# Patient Record
Sex: MALE | Race: WHITE | NOT HISPANIC OR LATINO | Employment: OTHER | URBAN - METROPOLITAN AREA
[De-identification: names, ages, dates, MRNs, and addresses within clinical notes are randomized per-mention and may not be internally consistent; named-entity substitution may affect disease eponyms.]

---

## 2019-01-24 ENCOUNTER — TELEPHONE (OUTPATIENT)
Dept: NEUROLOGY | Facility: CLINIC | Age: 64
End: 2019-01-24

## 2019-01-24 NOTE — TELEPHONE ENCOUNTER
----- Message from Mary Navarro sent at 1/24/2019 12:37 PM CST -----  Contact: wife  Type: Needs Medical Advice    Who Called:  Eleanor-wife  Best Call Back Number: [phone]  Additional Information: calling to get a new patient appt it would not let me book one. Would like a call back. thanks

## 2019-01-24 NOTE — TELEPHONE ENCOUNTER
No referral received. Pt wife advised referral needed prior to new pt appt scheduling pt wife to call PCP to obtain referral . She will contact office to schedule when completed.

## 2019-01-28 ENCOUNTER — TELEPHONE (OUTPATIENT)
Dept: NEUROLOGY | Facility: CLINIC | Age: 64
End: 2019-01-28

## 2019-01-28 NOTE — TELEPHONE ENCOUNTER
Spoke with pt wife and booked consult appt with Dr. Pires for hereditary and idiopathic neuropathy per Dr. Carlos Nazario; date/time/location confirmed; verbalized understanding.

## 2019-01-28 NOTE — TELEPHONE ENCOUNTER
----- Message from Elisha Hoang sent at 1/28/2019 11:20 AM CST -----  Contact: Eleanor Atwood (Spouse)  Type: Needs Medical Advice    Who Called:  Eleanor Coroneljil (Spouse)  Best Call Back Number:   Additional Information: Calling to speak with the Nurse. The referral is in the system but it is pending authorization. Please advise. Call to pod. No answer.

## 2019-01-30 ENCOUNTER — OFFICE VISIT (OUTPATIENT)
Dept: NEUROLOGY | Facility: CLINIC | Age: 64
End: 2019-01-30
Payer: COMMERCIAL

## 2019-01-30 ENCOUNTER — TELEPHONE (OUTPATIENT)
Dept: NEUROLOGY | Facility: CLINIC | Age: 64
End: 2019-01-30

## 2019-01-30 VITALS
SYSTOLIC BLOOD PRESSURE: 133 MMHG | BODY MASS INDEX: 27.32 KG/M2 | WEIGHT: 190.81 LBS | HEART RATE: 54 BPM | DIASTOLIC BLOOD PRESSURE: 81 MMHG | HEIGHT: 70 IN | RESPIRATION RATE: 20 BRPM

## 2019-01-30 DIAGNOSIS — M54.2 CERVICALGIA: ICD-10-CM

## 2019-01-30 DIAGNOSIS — R51.9 NONINTRACTABLE HEADACHE, UNSPECIFIED CHRONICITY PATTERN, UNSPECIFIED HEADACHE TYPE: ICD-10-CM

## 2019-01-30 DIAGNOSIS — R20.2 PARESTHESIA: Primary | ICD-10-CM

## 2019-01-30 PROCEDURE — 3008F BODY MASS INDEX DOCD: CPT | Mod: CPTII,S$GLB,, | Performed by: PSYCHIATRY & NEUROLOGY

## 2019-01-30 PROCEDURE — 99999 PR PBB SHADOW E&M-EST. PATIENT-LVL IV: ICD-10-PCS | Mod: PBBFAC,,, | Performed by: PSYCHIATRY & NEUROLOGY

## 2019-01-30 PROCEDURE — 3008F PR BODY MASS INDEX (BMI) DOCUMENTED: ICD-10-PCS | Mod: CPTII,S$GLB,, | Performed by: PSYCHIATRY & NEUROLOGY

## 2019-01-30 PROCEDURE — 99999 PR PBB SHADOW E&M-EST. PATIENT-LVL IV: CPT | Mod: PBBFAC,,, | Performed by: PSYCHIATRY & NEUROLOGY

## 2019-01-30 PROCEDURE — 99205 OFFICE O/P NEW HI 60 MIN: CPT | Mod: S$GLB,,, | Performed by: PSYCHIATRY & NEUROLOGY

## 2019-01-30 PROCEDURE — 99205 PR OFFICE/OUTPT VISIT, NEW, LEVL V, 60-74 MIN: ICD-10-PCS | Mod: S$GLB,,, | Performed by: PSYCHIATRY & NEUROLOGY

## 2019-01-30 RX ORDER — GABAPENTIN 300 MG/1
300 CAPSULE ORAL NIGHTLY
COMMUNITY
End: 2019-01-30

## 2019-01-30 RX ORDER — GABAPENTIN 300 MG/1
300 CAPSULE ORAL 3 TIMES DAILY
Qty: 90 CAPSULE | Refills: 11 | Status: SHIPPED | OUTPATIENT
Start: 2019-01-30 | End: 2020-10-05

## 2019-01-30 NOTE — LETTER
January 30, 2019      Carlos Nazario MD  [address]  [address]  [facility] [place] [address]           [facility]  [address] [facility] [address] [place] [address]  Phone: [phone]  Fax: [phone]          Patient: Javid Atwood   MR Number: [id number]   YOB: 1955   Date of Visit: 1/30/2019       Dear Dr. Carlos Nazario:    Thank you for referring Javid Atwood to me for evaluation. Attached you will find relevant portions of my assessment and plan of care.    If you have questions, please do not hesitate to call me. I look forward to following Javid Atwood along with you.    Sincerely,    Leonardo Pires Jr., MD    Enclosure  CC:  No Recipients    If you would like to receive this communication electronically, please contact [email or web address] or [phone] to request more information on [facility] access.    For providers and/or their staff who would like to refer a patient to [facility], please contact us through our one-stop-shop provider referral line, [facility], at [phone].    If you feel you have received this communication in error or would no longer like to receive these types of communications, please e-mail [email or web address]

## 2019-01-30 NOTE — PROGRESS NOTES
"Date of service:  1/30/2019    Chief complaint:  Paresthesias    History of present illness:  The patient is a 63 y.o. male referred for evaluation of paresthesia. This began about 2 months ago. The sensation is located in the feet bilaterally.  The feeling is characterized as "numb"/"tingly" and is moderate in intensity.  There are no exacerbating or relieving factors.  He has tried Neurontin 300 mg QHS with no benefit.  He also noticed no associated symptoms.  The sensation is present continuously.      The patient also reports that he started having headaches 6 months ago.  He indicates they are frontal.  They are throbbing in character and moderate in intensity.  He gets relief from Advil.  He notes no exacerbating factors.  He denies associated symptoms.  Each headache lasts for a few hours.  He reports about 1 headache every 1-2 weeks.    The patient also reports a history of neck pain.  This has been present for a number of years and has not changed recently.      Past Medical History:   Diagnosis Date    Numbness and tingling of both feet    Osteoarthritis      Past Surgical History:   Procedure Laterality Date    TOTAL KNEE  PROSTHESIS REMOVAL W/ SPACER INSERTION Right     TUMOR REMOVAL Left     left thigh   (patient not sure of the type of tumor)      Family History   Problem Relation Age of Onset    Bone cancer Mother     Lymphoma Mother     COPD Father     Prostate cancer Father        Social History     Socioeconomic History    Marital status:      Spouse name: None    Number of children: None    Years of education: None    Highest education level: None   Social Needs    Financial resource strain: None    Food insecurity - worry: None    Food insecurity - inability: None    Transportation needs - medical: None    Transportation needs - non-medical: None   Occupational History    None   Tobacco Use    Smoking status: Current Every Day Smoker     Packs/day: 1.00     Years: 40.00 " "    Pack years: 40.00    Smokeless tobacco: Never Used   Substance and Sexual Activity    Alcohol use: No     Frequency: Never    Drug use: No    Sexual activity: Yes     Partners: Female   Other Topics Concern    None   Social History Narrative    None   + alcohol use currently, formerly heavier but unable to specify      Review of patient's allergies indicates:  No Known Allergies    Review of Systems  General/Constitutional:  No unintentional weight loss, No change in appetite  Eyes/Vision:  No change in vision, No double vision  ENT:  No frequent nose bleeds, No ringing in the ears  Respiratory:  No cough, No wheezing  Cardiovascular:  No chest pain, No palpitations  Gastrointestinal:  No jaundice, No nausea/vomiting  Genitourinary:  No incontinence, No burning with urination  Hematologic/Lymphatic:  No easy bruising/bleeding, No night sweats  Neurological:  + numbness, No weakness  Endocrine:  No fatigue, No heat/cold intolerance  Allergy/Immunologic:  No fevers, No chills  Musculoskeletal:  No muscle pain, No joint pain   Psychiatric:  No thoughts of harming self/others, No depression  Integumentary:  No rashes, No sores that do not heal    Physical exam:  /81   Pulse (!) 54   Resp 20   Ht 5' 10" (1.778 m)   Wt 86.5 kg (190 lb 12.8 oz)   BMI 27.38 kg/m²   General: Well developed, well nourished.  No acute distress.  ENT: Mucus membranes moist.  Atraumatic external nose and ears.  Lymphatic: No apparent lymphadenopathy.  Cardiovascular: Regular rate and rhythm.  Pulmonary: No increased work of breathing.  Abdomen/GI: No guarding.  Musculoskeletal: No obvious joint deformities, moves all extremities well.    Neurological exam:  Mental status: Awake and alert.  Oriented x4.  Speech fluent and appropriate.  Recent and remote memory appear to be intact.  Fund of knowledge normal.  Cranial nerves: Pupils equal round and reactive to light, extraocular movements intact, facial strength and sensation " intact bilaterally, palate and tongue midline, hearing grossly intact bilaterally.  Motor: 5 out of 5 strength throughout the upper and lower extremities bilaterally. Normal bulk and tone.  Sensation: Intact to light touch and temperature bilaterally.  DTR: 2+ at the knees and biceps bilaterally.  Coordination: Finger-nose-finger testing intact bilaterally.  Gait: Normal gait. Good tandem.    Data base:  Notes of the referring physician were not available for review at the time of consultation.  We will request them.    Labs (1/19):  CMP: includes cr=1.1  CBC: unremarkable  Vitamin B12: 446  Vitamin B1: 153  TSH: 1.56    Assessment and plan:  The patient is a 63 y.o. male referred for evaluation of paresthesia. I suspect that this issue is related to a peripheral neuropathy.  Certainly, his history of alcohol consumption could be playing a contributory role.  There are no other obvious risk factors reported by the patient; however, we will obtain serologies to screen for other potential contributory factors.  We will also check an EMG to characterize his neuropathy further.  We will give the patient Neurontin 300 mg TID for symptomatic relief. Medication side effects were discussed with the patient.  I have instructed him to discontinue all alcohol use.    The etiology of the patient's headaches is unclear.  We will check an MRI of the brain to rule out structural causes of headache, particularly given his history of smoking.  He may use prn OTC analgesics given his current headache frequency.  If this worsens, though, we will consider referral to headache clinic.    He also reports a longstanding history of neck pain and desires further evaluation/treatment.  We will check plain films of the C-spine.  We will refer him to spine clinic.    Finally, I have counseled the patient to stop smoking.    We will plan on seeing the patient back in a few weeks.

## 2020-10-05 ENCOUNTER — OFFICE VISIT (OUTPATIENT)
Dept: FAMILY MEDICINE | Facility: CLINIC | Age: 65
End: 2020-10-05
Payer: COMMERCIAL

## 2020-10-05 VITALS
DIASTOLIC BLOOD PRESSURE: 80 MMHG | HEART RATE: 64 BPM | BODY MASS INDEX: 25.48 KG/M2 | SYSTOLIC BLOOD PRESSURE: 126 MMHG | HEIGHT: 70 IN | TEMPERATURE: 98 F | WEIGHT: 178 LBS

## 2020-10-05 DIAGNOSIS — L72.3 SEBACEOUS CYST: Primary | ICD-10-CM

## 2020-10-05 DIAGNOSIS — L40.9 PSORIASIS: ICD-10-CM

## 2020-10-05 DIAGNOSIS — G60.3 IDIOPATHIC PROGRESSIVE NEUROPATHY: ICD-10-CM

## 2020-10-05 PROCEDURE — 3008F BODY MASS INDEX DOCD: CPT | Mod: S$GLB,,, | Performed by: FAMILY MEDICINE

## 2020-10-05 PROCEDURE — 99213 PR OFFICE/OUTPT VISIT, EST, LEVL III, 20-29 MIN: ICD-10-PCS | Mod: S$GLB,,, | Performed by: FAMILY MEDICINE

## 2020-10-05 PROCEDURE — 99213 OFFICE O/P EST LOW 20 MIN: CPT | Mod: S$GLB,,, | Performed by: FAMILY MEDICINE

## 2020-10-05 PROCEDURE — 3008F PR BODY MASS INDEX (BMI) DOCUMENTED: ICD-10-PCS | Mod: S$GLB,,, | Performed by: FAMILY MEDICINE

## 2020-10-05 RX ORDER — CEPHALEXIN 500 MG/1
500 CAPSULE ORAL 3 TIMES DAILY
Qty: 30 CAPSULE | Refills: 0 | OUTPATIENT
Start: 2020-10-05 | End: 2021-11-30

## 2020-10-05 RX ORDER — CLOBETASOL PROPIONATE 0.5 MG/G
OINTMENT TOPICAL 2 TIMES DAILY
Qty: 45 G | Refills: 3 | Status: SHIPPED | OUTPATIENT
Start: 2020-10-05 | End: 2022-11-16 | Stop reason: CLARIF

## 2020-10-05 RX ORDER — PREGABALIN 75 MG/1
75 CAPSULE ORAL 2 TIMES DAILY
Qty: 60 CAPSULE | Refills: 3 | Status: SHIPPED | OUTPATIENT
Start: 2020-10-05 | End: 2023-04-20 | Stop reason: ALTCHOICE

## 2020-10-05 NOTE — LETTER
[address]  [place] [address]  Phone: [phone]   [phone]                        MD Ambreen Willis MD Chequita Williams, MD Matthew Bassett, PA-C Allison Hoffritz, CONNIE Bautista NP      Date: 10/05/2020        Patient: Javid Atwood  YOB: 1955      Please fax a copy of pt's recent colonoscopy.        Sincerely,     Ivy Zuluaga MA

## 2020-10-06 NOTE — PROGRESS NOTES
SUBJECTIVE:    Patient ID: Javid Atwood is a 65 y.o. male.    Chief Complaint: Mass (inner thigh ac // Colonoscopy - Dr. Gonzalez // Flu - already had -ac )    55-year-old male comes in with a swelling in the and groin 3-4 days.  He does not have a history of boils.  He has no streaking or cellulitis of the legs no fever or chills.    He does complain constant numbness to the bottom of his feet and had no relief with gabapentin in the past    He complains of some dry scaly rash on the pretibial area of his left shin.    He is a smoker however does not drink any alcohol.      No visits with results within 6 Month(s) from this visit.   Latest known visit with results is:   No results found for any previous visit.       Past Medical History:   Diagnosis Date    Numbness and tingling of both feet      Social History     Socioeconomic History    Marital status:      Spouse name: Not on file    Number of children: Not on file    Years of education: Not on file    Highest education level: Not on file   Occupational History    Not on file   Social Needs    Financial resource strain: Not on file    Food insecurity     Worry: Not on file     Inability: Not on file    Transportation needs     Medical: Not on file     Non-medical: Not on file   Tobacco Use    Smoking status: Current Every Day Smoker     Packs/day: 1.00     Years: 40.00     Pack years: 40.00    Smokeless tobacco: Never Used   Substance and Sexual Activity    Alcohol use: No     Frequency: Never    Drug use: No    Sexual activity: Yes     Partners: Female   Lifestyle    Physical activity     Days per week: Not on file     Minutes per session: Not on file    Stress: Not on file   Relationships    Social connections     Talks on phone: Not on file     Gets together: Not on file     Attends Bahai service: Not on file     Active member of club or organization: Not on file     Attends meetings of clubs or organizations: Not on  "file     Relationship status: Not on file   Other Topics Concern    Not on file   Social History Narrative    Not on file     Past Surgical History:   Procedure Laterality Date    TOTAL KNEE  PROSTHESIS REMOVAL W/ SPACER INSERTION Right     TUMOR REMOVAL Left     left thigh     Family History   Problem Relation Age of Onset    Bone cancer Mother     Lymphoma Mother     COPD Father     Prostate cancer Father        Review of patient's allergies indicates:  No Known Allergies    Current Outpatient Medications:     cephALEXin (KEFLEX) 500 MG capsule, Take 1 capsule (500 mg total) by mouth 3 (three) times daily., Disp: 30 capsule, Rfl: 0    clobetasol 0.05% (TEMOVATE) 0.05 % Oint, Apply topically 2 (two) times daily., Disp: 45 g, Rfl: 3    pregabalin (LYRICA) 75 MG capsule, Take 1 capsule (75 mg total) by mouth 2 (two) times daily., Disp: 60 capsule, Rfl: 3    Review of Systems   Constitutional: Negative for chills and fever.   Genitourinary: Negative for difficulty urinating and dysuria.   Skin: Positive for rash.          Objective:      Vitals:    10/05/20 1124   BP: 126/80   Pulse: 64   Temp: 98.3 °F (36.8 °C)   Weight: 80.7 kg (178 lb)   Height: 5' 10" (1.778 m)     Physical Exam  Vitals signs and nursing note reviewed.   Constitutional:       Appearance: He is well-developed.   HENT:      Head: Normocephalic and atraumatic.      Right Ear: External ear normal.      Left Ear: External ear normal.      Nose: Nose normal.   Eyes:      Pupils: Pupils are equal, round, and reactive to light.   Neck:      Musculoskeletal: Normal range of motion and neck supple.      Thyroid: No thyromegaly.      Vascular: No carotid bruit.   Cardiovascular:      Rate and Rhythm: Normal rate and regular rhythm.      Heart sounds: Normal heart sounds. No murmur.   Pulmonary:      Effort: Pulmonary effort is normal.      Breath sounds: Normal breath sounds. No wheezing or rales.   Abdominal:      General: Bowel sounds are " normal. There is no distension.      Palpations: Abdomen is soft.      Tenderness: There is no abdominal tenderness.   Genitourinary:     Comments: 2 x 2 cm sebaceous cyst in the left perineum, firm not fluctuant.  Musculoskeletal: Normal range of motion.         General: No tenderness or deformity.      Lumbar back: Normal. He exhibits no pain and no spasm.      Comments: Bends 90 degrees at  waist   Lymphadenopathy:      Cervical: No cervical adenopathy.   Skin:     General: Skin is warm and dry.      Findings: No rash.      Comments: Left shin has dry silvery scaly crusty lesions compatible with psoriasis.   Neurological:      Mental Status: He is alert and oriented to person, place, and time.      Cranial Nerves: No cranial nerve deficit.      Coordination: Coordination normal.   Psychiatric:         Behavior: Behavior normal.         Thought Content: Thought content normal.         Judgment: Judgment normal.           Assessment:       1. Sebaceous cyst    2. Idiopathic progressive neuropathy    3. Psoriasis         Plan:       Sebaceous cyst  -     pregabalin (LYRICA) 75 MG capsule; Take 1 capsule (75 mg total) by mouth 2 (two) times daily.  Dispense: 60 capsule; Refill: 3    Idiopathic progressive neuropathy  -     cephALEXin (KEFLEX) 500 MG capsule; Take 1 capsule (500 mg total) by mouth 3 (three) times daily.  Dispense: 30 capsule; Refill: 0    Psoriasis  -     clobetasol 0.05% (TEMOVATE) 0.05 % Oint; Apply topically 2 (two) times daily.  Dispense: 45 g; Refill: 3      Follow up in about 6 months (around 4/5/2021), or Neuropathy.        10/5/2020 Carlos Nazario

## 2021-04-29 ENCOUNTER — PATIENT MESSAGE (OUTPATIENT)
Dept: RESEARCH | Facility: HOSPITAL | Age: 66
End: 2021-04-29

## 2021-11-12 ENCOUNTER — HOSPITAL ENCOUNTER (EMERGENCY)
Facility: HOSPITAL | Age: 66
Discharge: HOME | End: 2021-11-12
Attending: EMERGENCY MEDICINE
Payer: COMMERCIAL

## 2021-11-12 VITALS
HEART RATE: 56 BPM | TEMPERATURE: 98 F | OXYGEN SATURATION: 97 % | WEIGHT: 180 LBS | HEIGHT: 69 IN | SYSTOLIC BLOOD PRESSURE: 124 MMHG | RESPIRATION RATE: 20 BRPM | DIASTOLIC BLOOD PRESSURE: 78 MMHG | BODY MASS INDEX: 26.66 KG/M2

## 2021-11-12 DIAGNOSIS — R07.9 CHEST PAIN: ICD-10-CM

## 2021-11-12 DIAGNOSIS — Z53.29 LEFT AGAINST MEDICAL ADVICE: ICD-10-CM

## 2021-11-12 DIAGNOSIS — J32.9 SINUSITIS, UNSPECIFIED CHRONICITY, UNSPECIFIED LOCATION: Primary | ICD-10-CM

## 2021-11-12 LAB
ALBUMIN SERPL BCP-MCNC: 4 G/DL (ref 3.5–5.2)
ALP SERPL-CCNC: 57 U/L (ref 55–135)
ALT SERPL W/O P-5'-P-CCNC: 18 U/L (ref 10–44)
ANION GAP SERPL CALC-SCNC: 10 MMOL/L (ref 8–16)
AST SERPL-CCNC: 19 U/L (ref 10–40)
BASOPHILS # BLD AUTO: 0.07 K/UL (ref 0–0.2)
BASOPHILS NFR BLD: 0.7 % (ref 0–1.9)
BILIRUB SERPL-MCNC: 1.3 MG/DL (ref 0.1–1)
BNP SERPL-MCNC: 34 PG/ML (ref 0–99)
BUN SERPL-MCNC: 20 MG/DL (ref 8–23)
CALCIUM SERPL-MCNC: 9 MG/DL (ref 8.7–10.5)
CHLORIDE SERPL-SCNC: 107 MMOL/L (ref 95–110)
CK SERPL-CCNC: 103 U/L (ref 20–200)
CO2 SERPL-SCNC: 23 MMOL/L (ref 23–29)
CREAT SERPL-MCNC: 0.9 MG/DL (ref 0.5–1.4)
D DIMER PPP IA.FEU-MCNC: 0.3 UG/ML FEU
DIFFERENTIAL METHOD: ABNORMAL
EOSINOPHIL # BLD AUTO: 0.1 K/UL (ref 0–0.5)
EOSINOPHIL NFR BLD: 1.1 % (ref 0–8)
ERYTHROCYTE [DISTWIDTH] IN BLOOD BY AUTOMATED COUNT: 13.5 % (ref 11.5–14.5)
EST. GFR  (AFRICAN AMERICAN): >60 ML/MIN/1.73 M^2
EST. GFR  (NON AFRICAN AMERICAN): >60 ML/MIN/1.73 M^2
GLUCOSE SERPL-MCNC: 102 MG/DL (ref 70–110)
HCT VFR BLD AUTO: 47 % (ref 40–54)
HGB BLD-MCNC: 15.8 G/DL (ref 14–18)
IMM GRANULOCYTES # BLD AUTO: 0.04 K/UL (ref 0–0.04)
IMM GRANULOCYTES NFR BLD AUTO: 0.4 % (ref 0–0.5)
INR PPP: 1.1
LIPASE SERPL-CCNC: 20 U/L (ref 4–60)
LYMPHOCYTES # BLD AUTO: 1.4 K/UL (ref 1–4.8)
LYMPHOCYTES NFR BLD: 14.1 % (ref 18–48)
MAGNESIUM SERPL-MCNC: 2.2 MG/DL (ref 1.6–2.6)
MCH RBC QN AUTO: 29.8 PG (ref 27–31)
MCHC RBC AUTO-ENTMCNC: 33.6 G/DL (ref 32–36)
MCV RBC AUTO: 89 FL (ref 82–98)
MONOCYTES # BLD AUTO: 0.6 K/UL (ref 0.3–1)
MONOCYTES NFR BLD: 5.8 % (ref 4–15)
NEUTROPHILS # BLD AUTO: 7.9 K/UL (ref 1.8–7.7)
NEUTROPHILS NFR BLD: 77.9 % (ref 38–73)
NRBC BLD-RTO: 0 /100 WBC
PLATELET # BLD AUTO: 221 K/UL (ref 150–450)
PMV BLD AUTO: 9.9 FL (ref 9.2–12.9)
POTASSIUM SERPL-SCNC: 3.8 MMOL/L (ref 3.5–5.1)
PROT SERPL-MCNC: 7.1 G/DL (ref 6–8.4)
PROTHROMBIN TIME: 13 SEC (ref 11.4–13.7)
RBC # BLD AUTO: 5.31 M/UL (ref 4.6–6.2)
SARS-COV-2 RDRP RESP QL NAA+PROBE: NEGATIVE
SODIUM SERPL-SCNC: 140 MMOL/L (ref 136–145)
TROPONIN I SERPL DL<=0.01 NG/ML-MCNC: <0.03 NG/ML
WBC # BLD AUTO: 10.15 K/UL (ref 3.9–12.7)

## 2021-11-12 PROCEDURE — 85610 PROTHROMBIN TIME: CPT | Performed by: EMERGENCY MEDICINE

## 2021-11-12 PROCEDURE — 84484 ASSAY OF TROPONIN QUANT: CPT | Performed by: EMERGENCY MEDICINE

## 2021-11-12 PROCEDURE — 83880 ASSAY OF NATRIURETIC PEPTIDE: CPT | Performed by: EMERGENCY MEDICINE

## 2021-11-12 PROCEDURE — 83690 ASSAY OF LIPASE: CPT | Performed by: EMERGENCY MEDICINE

## 2021-11-12 PROCEDURE — 93010 ELECTROCARDIOGRAM REPORT: CPT | Mod: ,,, | Performed by: GENERAL PRACTICE

## 2021-11-12 PROCEDURE — 93010 EKG 12-LEAD: ICD-10-PCS | Mod: ,,, | Performed by: GENERAL PRACTICE

## 2021-11-12 PROCEDURE — 93005 ELECTROCARDIOGRAM TRACING: CPT | Performed by: GENERAL PRACTICE

## 2021-11-12 PROCEDURE — 99284 EMERGENCY DEPT VISIT MOD MDM: CPT | Mod: 25

## 2021-11-12 PROCEDURE — 36415 COLL VENOUS BLD VENIPUNCTURE: CPT | Performed by: EMERGENCY MEDICINE

## 2021-11-12 PROCEDURE — 80053 COMPREHEN METABOLIC PANEL: CPT | Performed by: EMERGENCY MEDICINE

## 2021-11-12 PROCEDURE — 85025 COMPLETE CBC W/AUTO DIFF WBC: CPT | Performed by: EMERGENCY MEDICINE

## 2021-11-12 PROCEDURE — 82550 ASSAY OF CK (CPK): CPT | Performed by: EMERGENCY MEDICINE

## 2021-11-12 PROCEDURE — U0002 COVID-19 LAB TEST NON-CDC: HCPCS | Performed by: EMERGENCY MEDICINE

## 2021-11-12 PROCEDURE — 85379 FIBRIN DEGRADATION QUANT: CPT | Performed by: EMERGENCY MEDICINE

## 2021-11-12 PROCEDURE — 83735 ASSAY OF MAGNESIUM: CPT | Performed by: EMERGENCY MEDICINE

## 2021-11-12 RX ORDER — ONDANSETRON 4 MG/1
4 TABLET, ORALLY DISINTEGRATING ORAL EVERY 6 HOURS PRN
Qty: 9 TABLET | Refills: 0 | Status: SHIPPED | OUTPATIENT
Start: 2021-11-12 | End: 2022-11-16 | Stop reason: CLARIF

## 2021-11-30 ENCOUNTER — HOSPITAL ENCOUNTER (EMERGENCY)
Facility: HOSPITAL | Age: 66
Discharge: HOME | End: 2021-11-30
Attending: EMERGENCY MEDICINE
Payer: OTHER MISCELLANEOUS

## 2021-11-30 VITALS
DIASTOLIC BLOOD PRESSURE: 79 MMHG | HEART RATE: 58 BPM | OXYGEN SATURATION: 99 % | RESPIRATION RATE: 18 BRPM | TEMPERATURE: 98 F | BODY MASS INDEX: 27.4 KG/M2 | WEIGHT: 185 LBS | HEIGHT: 69 IN | SYSTOLIC BLOOD PRESSURE: 132 MMHG

## 2021-11-30 DIAGNOSIS — S61.411A LACERATION OF RIGHT HAND WITHOUT FOREIGN BODY, INITIAL ENCOUNTER: Primary | ICD-10-CM

## 2021-11-30 PROCEDURE — 25000003 PHARM REV CODE 250: Performed by: PHYSICIAN ASSISTANT

## 2021-11-30 PROCEDURE — 12002 RPR S/N/AX/GEN/TRNK2.6-7.5CM: CPT | Mod: RT

## 2021-11-30 PROCEDURE — 99283 EMERGENCY DEPT VISIT LOW MDM: CPT | Mod: 25

## 2021-11-30 RX ORDER — LIDOCAINE HYDROCHLORIDE 10 MG/ML
10 INJECTION INFILTRATION; PERINEURAL
Status: COMPLETED | OUTPATIENT
Start: 2021-11-30 | End: 2021-11-30

## 2021-11-30 RX ORDER — CEPHALEXIN 500 MG/1
500 CAPSULE ORAL 4 TIMES DAILY
Qty: 20 CAPSULE | Refills: 0 | Status: SHIPPED | OUTPATIENT
Start: 2021-11-30 | End: 2021-12-05

## 2021-11-30 RX ADMIN — LIDOCAINE HYDROCHLORIDE 10 ML: 10 INJECTION, SOLUTION INFILTRATION; PERINEURAL at 11:11

## 2021-12-08 ENCOUNTER — TELEPHONE (OUTPATIENT)
Dept: FAMILY MEDICINE | Facility: CLINIC | Age: 66
End: 2021-12-08
Payer: OTHER GOVERNMENT

## 2021-12-09 ENCOUNTER — HOSPITAL ENCOUNTER (EMERGENCY)
Facility: HOSPITAL | Age: 66
Discharge: HOME | End: 2021-12-09
Attending: EMERGENCY MEDICINE
Payer: OTHER MISCELLANEOUS

## 2021-12-09 VITALS
TEMPERATURE: 98 F | SYSTOLIC BLOOD PRESSURE: 118 MMHG | BODY MASS INDEX: 27.43 KG/M2 | WEIGHT: 185.19 LBS | HEIGHT: 69 IN | OXYGEN SATURATION: 98 % | RESPIRATION RATE: 18 BRPM | DIASTOLIC BLOOD PRESSURE: 74 MMHG | HEART RATE: 57 BPM

## 2021-12-09 DIAGNOSIS — Z48.02 VISIT FOR SUTURE REMOVAL: Primary | ICD-10-CM

## 2021-12-09 DIAGNOSIS — Z51.89 VISIT FOR WOUND CHECK: ICD-10-CM

## 2021-12-09 PROCEDURE — 99282 EMERGENCY DEPT VISIT SF MDM: CPT

## 2021-12-14 ENCOUNTER — PES CALL (OUTPATIENT)
Dept: ADMINISTRATIVE | Facility: CLINIC | Age: 66
End: 2021-12-14
Payer: COMMERCIAL

## 2022-11-16 ENCOUNTER — HOSPITAL ENCOUNTER (EMERGENCY)
Facility: HOSPITAL | Age: 67
Discharge: HOME | End: 2022-11-16
Attending: EMERGENCY MEDICINE
Payer: OTHER GOVERNMENT

## 2022-11-16 VITALS
BODY MASS INDEX: 28.14 KG/M2 | HEIGHT: 69 IN | HEART RATE: 82 BPM | TEMPERATURE: 99 F | WEIGHT: 190 LBS | OXYGEN SATURATION: 96 % | SYSTOLIC BLOOD PRESSURE: 104 MMHG | RESPIRATION RATE: 18 BRPM | DIASTOLIC BLOOD PRESSURE: 64 MMHG

## 2022-11-16 DIAGNOSIS — U07.1 COVID-19 VIRUS DETECTED: ICD-10-CM

## 2022-11-16 DIAGNOSIS — U07.1 COVID-19: Primary | ICD-10-CM

## 2022-11-16 LAB
HCV AB SERPL QL IA: NORMAL
INFLUENZA A, MOLECULAR: NEGATIVE
INFLUENZA B, MOLECULAR: NEGATIVE
SARS-COV-2 RDRP RESP QL NAA+PROBE: POSITIVE
SPECIMEN SOURCE: NORMAL

## 2022-11-16 PROCEDURE — 36415 COLL VENOUS BLD VENIPUNCTURE: CPT | Performed by: EMERGENCY MEDICINE

## 2022-11-16 PROCEDURE — 87502 INFLUENZA DNA AMP PROBE: CPT | Performed by: EMERGENCY MEDICINE

## 2022-11-16 PROCEDURE — 87389 HIV-1 AG W/HIV-1&-2 AB AG IA: CPT | Performed by: EMERGENCY MEDICINE

## 2022-11-16 PROCEDURE — 99283 EMERGENCY DEPT VISIT LOW MDM: CPT | Mod: 25

## 2022-11-16 PROCEDURE — 86803 HEPATITIS C AB TEST: CPT | Performed by: EMERGENCY MEDICINE

## 2022-11-16 PROCEDURE — U0002 COVID-19 LAB TEST NON-CDC: HCPCS | Performed by: EMERGENCY MEDICINE

## 2022-11-16 NOTE — ED NOTES
Assumed care    Patient presents to ED with complaints of body aches, body weakness and chills. States it started this morning. Denies chest pain. Denies shortness of breath. Denies any recent falls. Denies taking meds at home. Patient states he has been around significant other who is sick.

## 2022-11-16 NOTE — ED PROVIDER NOTES
Chief complaint:  Headache (And feels shaky, legs feel weak, ambulated to triage without help, gait is steady)      HPI:  Javid Atwood is a 67 y.o. male with hx tobacco use, PVD presenting with onset malaise, chills, subjective fever, and frontal headache similar to prior of gradual onset this morning relieved by 1 g of acetaminophen.  Notable sick contact in his wife with flu-like illness improving several days ago.  Measured fever here in triage.  No cough or congestion at present.  No travel history.  No dyspnea or chest pain.  He did have generalized weakness without new focal numbness or weakness.  He does have chronic history of unchanged neuropathy.  Denies difficulty speaking or swallowing.  Generalized weakness is improved following acetaminophen as well.    ROS: As per HPI and below:  No seizure, loss of consciousness, dyspnea, abdominal pain, vomiting, diarrhea, blood in the stools, oliguria, dysuria, rashes, swelling.  Positive for fever.    Review of patient's allergies indicates:  No Known Allergies    Discharge Medication List as of 11/16/2022  2:46 PM        START taking these medications    Details   nirmatrelvir-ritonavir 300 mg (150 mg x 2)-100 mg copackaged tablets (EUA) Take 3 tablets by mouth 2 (two) times daily for 5 days. Each dose contains 2 nirmatrelvir (pink tablets) and 1 ritonavir (white tablet). Take all 3 tablets together, Print           CONTINUE these medications which have NOT CHANGED    Details   pregabalin (LYRICA) 75 MG capsule Take 1 capsule (75 mg total) by mouth 2 (two) times daily., Starting Mon 10/5/2020, Until Mon 4/5/2021, Normal             PMH:  As per HPI and below:  Past Medical History:   Diagnosis Date    Numbness and tingling of both feet      Past Surgical History:   Procedure Laterality Date    TOTAL KNEE  PROSTHESIS REMOVAL W/ SPACER INSERTION Right     TUMOR REMOVAL Left     left thigh       Social History     Socioeconomic History    Marital status:     Tobacco Use    Smoking status: Every Day     Packs/day: 1.00     Years: 40.00     Pack years: 40.00     Types: Cigarettes    Smokeless tobacco: Never   Substance and Sexual Activity    Alcohol use: No    Drug use: No    Sexual activity: Yes     Partners: Female       Family History   Problem Relation Age of Onset    Bone cancer Mother     Lymphoma Mother     COPD Father     Prostate cancer Father        Physical Exam:    Vitals:    11/16/22 1455   BP: 104/64   Pulse:    Resp:    Temp: 99.2 °F (37.3 °C)     GENERAL:  No apparent distress.  Alert.    HEENT:  Moist mucous membranes.  Normocephalic and atraumatic.    NECK:  No swelling.  Midline trachea. Supple.    CARDIOVASCULAR:  Regular rate and rhythm.  2+ radial pulses.  No murmur.    PULMONARY:  Lungs clear to auscultation bilaterally.  No wheezes, rales, or rhonci.    ABDOMEN:  Non-tender and non-distended.    EXTREMITIES:  Warm and well perfused.  Brisk capillary refill.    NEUROLOGICAL:  Normal mental status.  Appropriate and conversant.  5/5 strength and sensation.  CN III through XII intact.  Normal gait.    SKIN:  No rashes or ecchymoses.    BACK:  Atraumatic.  No CVA tenderness to palpation.      Labs Reviewed   SARS-COV-2 RNA AMPLIFICATION, QUAL - Abnormal; Notable for the following components:       Result Value    SARS-CoV-2 RNA, Amplification, Qual Positive (*)     All other components within normal limits   INFLUENZA A & B BY MOLECULAR   HIV 1 / 2 ANTIBODY   HEPATITIS C ANTIBODY       Discharge Medication List as of 11/16/2022  2:46 PM        START taking these medications    Details   nirmatrelvir-ritonavir 300 mg (150 mg x 2)-100 mg copackaged tablets (EUA) Take 3 tablets by mouth 2 (two) times daily for 5 days. Each dose contains 2 nirmatrelvir (pink tablets) and 1 ritonavir (white tablet). Take all 3 tablets together, Print           CONTINUE these medications which have NOT CHANGED    Details   pregabalin (LYRICA) 75 MG capsule Take 1  capsule (75 mg total) by mouth 2 (two) times daily., Starting Mon 10/5/2020, Until Mon 4/5/2021, Normal             Orders Placed This Encounter   Procedures    Influenza A & B by Molecular    COVID-19 Rapid Screening       Imaging Results    None              MDM:    67 y.o. male with malaise, chills, fever, and frontal headache.  Patient has no nuchal rigidity and minimal headache at present after acetaminophen.  I have very low suspicion for meningitis or encephalitis. I have very low suspicion for alternative causes of headache such as intracranial hemorrhage, ischemic process, meningitis, idiopathic intracranial hypertension, or cavernous venous sinus thrombosis.  The patient has a non-focal neurological examination with history not consistent with emergent causes of headache warranting present therapeutic intervention.  I do not think further brain imaging or lumbar puncture are indicated at this time.  Fever and constitutional symptoms much more likely related to viral syndrome with close sick contact insignificant other present.  May continue acetaminophen as necessary with NSAIDs not ideal secondary to other medical issues.  Influenza and COVID point of care testing sent here.  Lungs are clear to auscultation I doubt bacterial pneumonia.  I do not think chest imaging or antibiotics are indicated at this point.  Testing here is positive for COVID-19 which likely explains patient's symptoms.  He is appropriate for outpatient therapy with paxlovid prescribed.  Follow-up with PCP.  Detailed return precautions reviewed.    Diagnoses:    1. COVID-19 infection       Jose Miguel Kuhn MD  11/16/22 7279

## 2022-11-17 LAB — HIV 1+2 AB+HIV1 P24 AG SERPL QL IA: NORMAL

## 2023-04-20 ENCOUNTER — TELEPHONE (OUTPATIENT)
Dept: FAMILY MEDICINE | Facility: CLINIC | Age: 68
End: 2023-04-20

## 2023-04-20 ENCOUNTER — HOSPITAL ENCOUNTER (EMERGENCY)
Facility: HOSPITAL | Age: 68
Discharge: HOME | End: 2023-04-20
Attending: EMERGENCY MEDICINE
Payer: OTHER GOVERNMENT

## 2023-04-20 VITALS
WEIGHT: 190 LBS | HEART RATE: 54 BPM | OXYGEN SATURATION: 100 % | BODY MASS INDEX: 28.06 KG/M2 | RESPIRATION RATE: 18 BRPM | TEMPERATURE: 98 F | DIASTOLIC BLOOD PRESSURE: 90 MMHG | SYSTOLIC BLOOD PRESSURE: 149 MMHG

## 2023-04-20 DIAGNOSIS — J32.9 SINUSITIS, UNSPECIFIED CHRONICITY, UNSPECIFIED LOCATION: ICD-10-CM

## 2023-04-20 DIAGNOSIS — R55 NEAR SYNCOPE: Primary | ICD-10-CM

## 2023-04-20 LAB
ALBUMIN SERPL BCP-MCNC: 3.5 G/DL (ref 3.5–5.2)
ALP SERPL-CCNC: 61 U/L (ref 55–135)
ALT SERPL W/O P-5'-P-CCNC: 18 U/L (ref 10–44)
ANION GAP SERPL CALC-SCNC: 7 MMOL/L (ref 8–16)
AST SERPL-CCNC: 16 U/L (ref 10–40)
BASOPHILS # BLD AUTO: 0.08 K/UL (ref 0–0.2)
BASOPHILS NFR BLD: 1.2 % (ref 0–1.9)
BILIRUB SERPL-MCNC: 0.4 MG/DL (ref 0.1–1)
BNP SERPL-MCNC: 23 PG/ML (ref 0–99)
BUN SERPL-MCNC: 20 MG/DL (ref 8–23)
CALCIUM SERPL-MCNC: 8.9 MG/DL (ref 8.7–10.5)
CHLORIDE SERPL-SCNC: 107 MMOL/L (ref 95–110)
CO2 SERPL-SCNC: 25 MMOL/L (ref 23–29)
CREAT SERPL-MCNC: 1 MG/DL (ref 0.5–1.4)
DIFFERENTIAL METHOD: NORMAL
EOSINOPHIL # BLD AUTO: 0.2 K/UL (ref 0–0.5)
EOSINOPHIL NFR BLD: 3.2 % (ref 0–8)
ERYTHROCYTE [DISTWIDTH] IN BLOOD BY AUTOMATED COUNT: 13.7 % (ref 11.5–14.5)
EST. GFR  (NO RACE VARIABLE): >60 ML/MIN/1.73 M^2
GLUCOSE SERPL-MCNC: 87 MG/DL (ref 70–110)
GLUCOSE SERPL-MCNC: 87 MG/DL (ref 70–110)
HCT VFR BLD AUTO: 44.5 % (ref 40–54)
HGB BLD-MCNC: 14.9 G/DL (ref 14–18)
IMM GRANULOCYTES # BLD AUTO: 0.02 K/UL (ref 0–0.04)
IMM GRANULOCYTES NFR BLD AUTO: 0.3 % (ref 0–0.5)
LYMPHOCYTES # BLD AUTO: 2.2 K/UL (ref 1–4.8)
LYMPHOCYTES NFR BLD: 31.7 % (ref 18–48)
MAGNESIUM SERPL-MCNC: 2 MG/DL (ref 1.6–2.6)
MCH RBC QN AUTO: 29.9 PG (ref 27–31)
MCHC RBC AUTO-ENTMCNC: 33.5 G/DL (ref 32–36)
MCV RBC AUTO: 89 FL (ref 82–98)
MONOCYTES # BLD AUTO: 0.7 K/UL (ref 0.3–1)
MONOCYTES NFR BLD: 9.6 % (ref 4–15)
NEUTROPHILS # BLD AUTO: 3.7 K/UL (ref 1.8–7.7)
NEUTROPHILS NFR BLD: 54 % (ref 38–73)
NRBC BLD-RTO: 0 /100 WBC
PLATELET # BLD AUTO: 195 K/UL (ref 150–450)
PMV BLD AUTO: 9.5 FL (ref 9.2–12.9)
POTASSIUM SERPL-SCNC: 4.3 MMOL/L (ref 3.5–5.1)
PROT SERPL-MCNC: 6.2 G/DL (ref 6–8.4)
RBC # BLD AUTO: 4.99 M/UL (ref 4.6–6.2)
SODIUM SERPL-SCNC: 139 MMOL/L (ref 136–145)
TROPONIN I SERPL HS-MCNC: 3.6 PG/ML (ref 0–14.9)
WBC # BLD AUTO: 6.84 K/UL (ref 3.9–12.7)

## 2023-04-20 PROCEDURE — 93010 EKG 12-LEAD: ICD-10-PCS | Mod: ,,, | Performed by: SPECIALIST

## 2023-04-20 PROCEDURE — 80053 COMPREHEN METABOLIC PANEL: CPT | Performed by: EMERGENCY MEDICINE

## 2023-04-20 PROCEDURE — 99285 EMERGENCY DEPT VISIT HI MDM: CPT | Mod: 25

## 2023-04-20 PROCEDURE — 93005 ELECTROCARDIOGRAM TRACING: CPT | Performed by: SPECIALIST

## 2023-04-20 PROCEDURE — 83735 ASSAY OF MAGNESIUM: CPT | Performed by: EMERGENCY MEDICINE

## 2023-04-20 PROCEDURE — 85025 COMPLETE CBC W/AUTO DIFF WBC: CPT | Performed by: EMERGENCY MEDICINE

## 2023-04-20 PROCEDURE — 82962 GLUCOSE BLOOD TEST: CPT

## 2023-04-20 PROCEDURE — 83880 ASSAY OF NATRIURETIC PEPTIDE: CPT | Performed by: EMERGENCY MEDICINE

## 2023-04-20 PROCEDURE — 93010 ELECTROCARDIOGRAM REPORT: CPT | Mod: ,,, | Performed by: SPECIALIST

## 2023-04-20 PROCEDURE — 84484 ASSAY OF TROPONIN QUANT: CPT | Performed by: EMERGENCY MEDICINE

## 2023-04-20 RX ORDER — ASPIRIN 81 MG/1
81 TABLET ORAL DAILY
COMMUNITY
Start: 2022-08-16

## 2023-04-20 RX ORDER — PRAVASTATIN SODIUM 20 MG/1
1 TABLET ORAL DAILY
COMMUNITY
Start: 2022-08-16

## 2023-04-20 NOTE — ED PROVIDER NOTES
"Encounter Date: 4/20/2023       History     Chief Complaint   Patient presents with    Headache     Generalized, for "a while" . Symptoms have resolved.      Patient presents complaining of episode of double vision that occurred for approximately 10-15 seconds and patient fell flank.  Patient states he was driving yesterday to fall again when he felt symptoms of double vision like he may stay.  He.  Heart and his wife started driving and his symptoms resolved.  He denies any one-sided numbness tingling weakness.  He denies any headache associated with it.  He denies any persistence of the blurry vision now.  He denies chest pain or shortness of breath.    Review of patient's allergies indicates:  No Known Allergies  Past Medical History:   Diagnosis Date    Numbness and tingling of both feet      Past Surgical History:   Procedure Laterality Date    TOTAL KNEE  PROSTHESIS REMOVAL W/ SPACER INSERTION Right     TUMOR REMOVAL Left     left thigh     Family History   Problem Relation Age of Onset    Bone cancer Mother     Lymphoma Mother     COPD Father     Prostate cancer Father      Social History     Tobacco Use    Smoking status: Every Day     Packs/day: 1.00     Years: 40.00     Pack years: 40.00     Types: Cigarettes    Smokeless tobacco: Never   Substance Use Topics    Alcohol use: No    Drug use: No     Review of Systems   All other systems reviewed and are negative.    Physical Exam     Initial Vitals [04/20/23 0943]   BP Pulse Resp Temp SpO2   104/84 (!) 56 20 98.3 °F (36.8 °C) 96 %      MAP       --         Physical Exam    Nursing note and vitals reviewed.  Constitutional: He appears well-developed and well-nourished. He is not diaphoretic. No distress.   HENT:   Head: Normocephalic and atraumatic.   Mouth/Throat: Oropharynx is clear and moist.   Eyes: EOM are normal.   Neck: Neck supple.   Normal range of motion.  Cardiovascular:  Normal rate, regular rhythm, normal heart sounds and intact distal pulses.    "        Pulmonary/Chest: Breath sounds normal. No respiratory distress.   Abdominal: Abdomen is soft.   Musculoskeletal:         General: Normal range of motion.      Cervical back: Normal range of motion and neck supple.     Neurological: He is alert and oriented to person, place, and time. He has normal strength.   Vision-normal  Neglect-normal  Aphasia - normal  Pronator drift - normal  Cerebellum - normal   Skin: Skin is warm and dry.   Psychiatric: He has a normal mood and affect. His behavior is normal. Judgment and thought content normal.       ED Course   Procedures  Labs Reviewed   COMPREHENSIVE METABOLIC PANEL - Abnormal; Notable for the following components:       Result Value    Anion Gap 7 (*)     All other components within normal limits   MAGNESIUM   CBC W/ AUTO DIFFERENTIAL   TROPONIN I HIGH SENSITIVITY   B-TYPE NATRIURETIC PEPTIDE   POCT GLUCOSE        ECG Results              EKG 12-lead (In process)  Result time 04/20/23 11:02:02      In process by Interface, Lab In [facility] (04/20/23 11:02:02)                   Narrative:    Test Reason : R07.9,    Vent. Rate : 048 BPM     Atrial Rate : 048 BPM     P-R Int : 202 ms          QRS Dur : 104 ms      QT Int : 438 ms       P-R-T Axes : 049 -10 033 degrees     QTc Int : 391 ms    Sinus bradycardia  Low voltage QRS  Incomplete right bundle branch block  Borderline Abnormal ECG  When compared with ECG of 12-NOV-2021 16:05,  Incomplete right bundle branch block is now Present    Referred By: [email or web address]   SELF           Confirmed By:                                   Imaging Results              X-Ray Chest AP Portable (Final result)  Result time 04/20/23 11:10:06      Final result by Prashant Bailey MD (04/20/23 11:10:06)                   Narrative:    XR CHEST 1 VIEW    CLINICAL HISTORY:  67 years Male Chest Pain    COMPARISON: November 12, 2021    FINDINGS: Cardiac silhouette size is within normal limits. No confluent airspace disease. No large pleural  effusion or pneumothorax. No acute osseous abnormality.    IMPRESSION: No acute pulmonary process.    Electronically signed by:  Prashant Bailey MD  4/20/2023 11:10 AM CDT Workstation: [id number]                                     CT Head Without Contrast (Final result)  Result time 04/20/23 10:33:36      Final result by Sabino Brooke MD (04/20/23 10:33:36)                   Narrative:    CMS MANDATED QUALITY DATA-CT RADIATION DOSE-436  All CT scans at this facility dose modulation, iterative reconstruction, and or weight-based dosing when appropriate to reduce radiation dose to as low as reasonably achievable.    HISTORY: Headache, chronic, new features or increased frequency    FINDINGS: Comparison to head CT of 11/12/2021. There is no acute intracranial hemorrhage, with no mass effect or abnormal extra-axial fluid. Mild scattered areas of nonspecific hypoattenuation involve the white matter, with gray-white differentiation maintained.    There is mild age-appropriate generalized prominence of the cortical sulci and ventricles. The cerebellum and brainstem are unremarkable. There is paranasal sinus mucosal thickening, with no sinus air-fluid levels. The mastoid air cells are clear. There is no acute osseous abnormality.    IMPRESSION:  1. No acute intracranial hemorrhage, mass effect, or loss of gray-white differentiation.  2. Paranasal sinus disease.    Electronically signed by:  Sabino Brooke MD  4/20/2023 10:33 AM CDT Workstation: [id number]                                     Medications - No data to display  Medical Decision Making:   Initial Assessment:   Well-appearing in no distress  Differential Diagnosis:   Intracranial hemorrhage, tumor, mass, sinusitis, electrolyte abnormalities, dehydration, acute kidney injury, most likely cardiac etiology  Clinical Tests:   Lab Tests: Reviewed and Ordered  Radiological Study: Ordered and Reviewed  Medical Tests: Reviewed and Ordered  ED Management:  MDM    Patient  presents for emergent evaluation of acute near syncope that poses a threat to life and/or bodily function.    In the ED patient found to have acute near syncope.    I ordered labs and personally reviewed them.  Labs significant for negative high sensitivity troponin, no evidence of acute kidney injury.  I ordered X-rays and personally reviewed them and reviewed the radiologist interpretation.  Xray significant for no acute cardiopulmonary process.    I ordered EKG and personally reviewed it.  EKG significant for sinus bradycardia.    I ordered CT scan and personally reviewed it and reviewed the radiologist interpretation.  CT significant for sinusitis no intracranial process.      Discharge MDM  Patient without any evidence of cardiac etiology for this short 10 15 seconds event.  Head CT normal.  Patient head CT does show some evidence of sinusitis which may have occurred some symptoms.  Patient was given prescription for antibiotic.  Patient does have some bradycardia however looking back on previous EKG this has been present for the last 2 years.  Patient has a nonfocal neurological exam.  He has had no ectopy in the emergency department.  No evidence of dehydration or acute kidney injury.  Patient was discharged in stable condition.  Detailed return precautions discussed.                        Clinical Impression:   Final diagnoses:  [R55] Near syncope (Primary)  [J32.9] Sinusitis, unspecified chronicity, unspecified location        ED Disposition Condition    Discharge Stable          ED Prescriptions    None       Follow-up Information       Follow up With Specialties Details Why Contact Info    Carlos Nazario MD Family Medicine, Home Health Services, Hospice Services Schedule an appointment as soon as possible for a visit in 2 days  [address]  [address]  [facility] [address]  [phone]               Goyo Lara MD  04/20/23 3932

## 2023-04-20 NOTE — TELEPHONE ENCOUNTER
Spoke with patients wife - we have not seen him since 2020. I let her know unfortunately we do not have any available appointments today but with the symptoms he is having we recommend the urgent care or ER now. She agrees to call back after they get him evaluated for the emergent issue to get him scheduled for an annual.

## 2023-04-20 NOTE — TELEPHONE ENCOUNTER
----- Message from Shana Hernandez sent at 4/20/2023  8:30 AM CDT -----  Pt wife called and stated while pt was driving he could not see. Once he got out of the car he was dizzy. She would like to get him scheduled today. [phone]

## 2025-04-14 ENCOUNTER — HOSPITAL ENCOUNTER (OUTPATIENT)
Facility: HOSPITAL | Age: 70
Discharge: HOME | End: 2025-04-15
Attending: EMERGENCY MEDICINE | Admitting: STUDENT IN AN ORGANIZED HEALTH CARE EDUCATION/TRAINING PROGRAM
Payer: MEDICARE

## 2025-04-14 DIAGNOSIS — K76.9 LESION OF LIVER: Primary | ICD-10-CM

## 2025-04-14 DIAGNOSIS — R10.11 RUQ ABDOMINAL PAIN: ICD-10-CM

## 2025-04-14 LAB
ABSOLUTE EOSINOPHIL (SMH): 0.31 K/UL
ABSOLUTE MONOCYTE (SMH): 0.88 K/UL (ref 0.3–1)
ABSOLUTE NEUTROPHIL COUNT (SMH): 4.1 K/UL (ref 1.8–7.7)
ALBUMIN SERPL-MCNC: 3.8 G/DL (ref 3.5–5.2)
ALP SERPL-CCNC: 96 UNIT/L (ref 40–150)
ALT SERPL-CCNC: 11 UNIT/L (ref 10–44)
ANION GAP (SMH): 6 MMOL/L (ref 8–16)
AST SERPL-CCNC: 17 UNIT/L (ref 11–45)
BASOPHILS # BLD AUTO: 0.09 K/UL
BASOPHILS NFR BLD AUTO: 1.1 %
BILIRUB SERPL-MCNC: 0.2 MG/DL (ref 0.1–1)
BILIRUB UR QL STRIP.AUTO: NEGATIVE
BUN SERPL-MCNC: 19 MG/DL (ref 8–23)
CALCIUM SERPL-MCNC: 8.6 MG/DL (ref 8.7–10.5)
CHLORIDE SERPL-SCNC: 110 MMOL/L (ref 95–110)
CLARITY UR: CLEAR
CO2 SERPL-SCNC: 24 MMOL/L (ref 23–29)
COLOR UR AUTO: YELLOW
CREAT SERPL-MCNC: 1.1 MG/DL (ref 0.5–1.4)
D DIMER PPP IA.FEU-MCNC: 0.47 MG/L FEU
ERYTHROCYTE [DISTWIDTH] IN BLOOD BY AUTOMATED COUNT: 13.3 % (ref 11.5–14.5)
GFR SERPLBLD CREATININE-BSD FMLA CKD-EPI: >60 ML/MIN/1.73/M2
GLUCOSE SERPL-MCNC: 106 MG/DL (ref 70–110)
GLUCOSE UR QL STRIP: NEGATIVE
HCT VFR BLD AUTO: 42.3 % (ref 40–54)
HGB BLD-MCNC: 13.9 GM/DL (ref 14–18)
HGB UR QL STRIP: NEGATIVE
IMM GRANULOCYTES # BLD AUTO: 0.03 K/UL (ref 0–0.04)
IMM GRANULOCYTES NFR BLD AUTO: 0.4 % (ref 0–0.5)
KETONES UR QL STRIP: NEGATIVE
LEUKOCYTE ESTERASE UR QL STRIP: NEGATIVE
LIPASE SERPL-CCNC: 22 U/L (ref 4–60)
LYMPHOCYTES # BLD AUTO: 2.46 K/UL (ref 1–4.8)
MCH RBC QN AUTO: 28.7 PG (ref 27–31)
MCHC RBC AUTO-ENTMCNC: 32.9 G/DL (ref 32–36)
MCV RBC AUTO: 87 FL (ref 82–98)
NITRITE UR QL STRIP: NEGATIVE
NUCLEATED RBC (/100WBC) (SMH): 0 /100 WBC
PH UR STRIP: 6 [PH]
PLATELET # BLD AUTO: 231 K/UL (ref 150–450)
PMV BLD AUTO: 9.3 FL (ref 9.2–12.9)
POTASSIUM SERPL-SCNC: 4.5 MMOL/L (ref 3.5–5.1)
PROT SERPL-MCNC: 6.5 GM/DL (ref 6–8.4)
PROT UR QL STRIP: ABNORMAL
RBC # BLD AUTO: 4.84 M/UL (ref 4.6–6.2)
RELATIVE EOSINOPHIL (SMH): 3.9 % (ref 0–8)
RELATIVE LYMPHOCYTE (SMH): 31.2 % (ref 18–48)
RELATIVE MONOCYTE (SMH): 11.2 % (ref 4–15)
RELATIVE NEUTROPHIL (SMH): 52.2 % (ref 38–73)
SODIUM SERPL-SCNC: 140 MMOL/L (ref 136–145)
SP GR UR STRIP: >=1.03
TROPONIN I SERPL HS-MCNC: <3 NG/L
UROBILINOGEN UR STRIP-ACNC: ABNORMAL EU/DL
WBC # BLD AUTO: 7.88 K/UL (ref 3.9–12.7)

## 2025-04-14 PROCEDURE — 93010 ELECTROCARDIOGRAM REPORT: CPT | Mod: ,,, | Performed by: GENERAL PRACTICE

## 2025-04-14 PROCEDURE — 84484 ASSAY OF TROPONIN QUANT: CPT | Performed by: EMERGENCY MEDICINE

## 2025-04-14 PROCEDURE — 93005 ELECTROCARDIOGRAM TRACING: CPT

## 2025-04-14 PROCEDURE — 80053 COMPREHEN METABOLIC PANEL: CPT | Performed by: EMERGENCY MEDICINE

## 2025-04-14 PROCEDURE — 81003 URINALYSIS AUTO W/O SCOPE: CPT | Performed by: EMERGENCY MEDICINE

## 2025-04-14 PROCEDURE — 83690 ASSAY OF LIPASE: CPT | Performed by: EMERGENCY MEDICINE

## 2025-04-14 PROCEDURE — 85025 COMPLETE CBC W/AUTO DIFF WBC: CPT | Performed by: EMERGENCY MEDICINE

## 2025-04-14 PROCEDURE — 63600175 PHARM REV CODE 636 W HCPCS: Mod: TB,JZ | Performed by: EMERGENCY MEDICINE

## 2025-04-14 PROCEDURE — 25500020 PHARM REV CODE 255

## 2025-04-14 PROCEDURE — 36415 COLL VENOUS BLD VENIPUNCTURE: CPT | Performed by: EMERGENCY MEDICINE

## 2025-04-14 PROCEDURE — 85379 FIBRIN DEGRADATION QUANT: CPT | Performed by: EMERGENCY MEDICINE

## 2025-04-14 RX ORDER — KETOROLAC TROMETHAMINE 30 MG/ML
15 INJECTION, SOLUTION INTRAMUSCULAR; INTRAVENOUS
Status: COMPLETED | OUTPATIENT
Start: 2025-04-14 | End: 2025-04-14

## 2025-04-14 RX ADMIN — KETOROLAC TROMETHAMINE 15 MG: 30 INJECTION, SOLUTION INTRAMUSCULAR at 09:04

## 2025-04-14 RX ADMIN — IOHEXOL 75 ML: 350 INJECTION, SOLUTION INTRAVENOUS at 10:04

## 2025-04-15 VITALS
HEART RATE: 47 BPM | TEMPERATURE: 98 F | WEIGHT: 186.31 LBS | DIASTOLIC BLOOD PRESSURE: 65 MMHG | RESPIRATION RATE: 16 BRPM | HEIGHT: 69 IN | OXYGEN SATURATION: 97 % | BODY MASS INDEX: 27.6 KG/M2 | SYSTOLIC BLOOD PRESSURE: 112 MMHG

## 2025-04-15 PROBLEM — K76.9 LIVER LESION: Status: ACTIVE | Noted: 2025-04-15

## 2025-04-15 PROBLEM — K63.89 PNEUMATOSIS COLI: Status: ACTIVE | Noted: 2025-04-15

## 2025-04-15 PROBLEM — F17.200 CURRENT SMOKER: Status: ACTIVE | Noted: 2025-04-15

## 2025-04-15 PROBLEM — R10.11 RIGHT UPPER QUADRANT ABDOMINAL PAIN: Status: ACTIVE | Noted: 2025-04-15

## 2025-04-15 LAB
ABSOLUTE EOSINOPHIL (SMH): 0.33 K/UL
ABSOLUTE MONOCYTE (SMH): 0.79 K/UL (ref 0.3–1)
ABSOLUTE NEUTROPHIL COUNT (SMH): 3.2 K/UL (ref 1.8–7.7)
ALBUMIN SERPL-MCNC: 3.7 G/DL (ref 3.5–5.2)
ALP SERPL-CCNC: 83 UNIT/L (ref 40–150)
ALT SERPL-CCNC: <8 UNIT/L (ref 10–44)
ANION GAP (SMH): 4 MMOL/L (ref 8–16)
APTT PPP: 31 SECONDS (ref 21–32)
AST SERPL-CCNC: 17 UNIT/L (ref 11–45)
BASOPHILS # BLD AUTO: 0.12 K/UL
BASOPHILS NFR BLD AUTO: 1.6 %
BILIRUB DIRECT SERPL-MCNC: 0.1 MG/DL (ref 0.1–0.3)
BILIRUB SERPL-MCNC: 0.2 MG/DL (ref 0.1–1)
BUN SERPL-MCNC: 17 MG/DL (ref 8–23)
CALCIUM SERPL-MCNC: 8.5 MG/DL (ref 8.7–10.5)
CHLORIDE SERPL-SCNC: 109 MMOL/L (ref 95–110)
CO2 SERPL-SCNC: 25 MMOL/L (ref 23–29)
CREAT SERPL-MCNC: 1.1 MG/DL (ref 0.5–1.4)
ERYTHROCYTE [DISTWIDTH] IN BLOOD BY AUTOMATED COUNT: 13.4 % (ref 11.5–14.5)
GFR SERPLBLD CREATININE-BSD FMLA CKD-EPI: >60 ML/MIN/1.73/M2
GLUCOSE SERPL-MCNC: 97 MG/DL (ref 70–110)
HCT VFR BLD AUTO: 42.5 % (ref 40–54)
HGB BLD-MCNC: 14 GM/DL (ref 14–18)
IMM GRANULOCYTES # BLD AUTO: 0.01 K/UL (ref 0–0.04)
IMM GRANULOCYTES NFR BLD AUTO: 0.1 % (ref 0–0.5)
LACTATE SERPL-SCNC: 1 MMOL/L (ref 0.5–2.2)
LYMPHOCYTES # BLD AUTO: 2.97 K/UL (ref 1–4.8)
MAGNESIUM SERPL-MCNC: 2.2 MG/DL (ref 1.6–2.6)
MCH RBC QN AUTO: 28.7 PG (ref 27–31)
MCHC RBC AUTO-ENTMCNC: 32.9 G/DL (ref 32–36)
MCV RBC AUTO: 87 FL (ref 82–98)
NUCLEATED RBC (/100WBC) (SMH): 0 /100 WBC
PHOSPHATE SERPL-MCNC: 2.6 MG/DL (ref 2.7–4.5)
PLATELET # BLD AUTO: 218 K/UL (ref 150–450)
PMV BLD AUTO: 9.4 FL (ref 9.2–12.9)
POTASSIUM SERPL-SCNC: 4.6 MMOL/L (ref 3.5–5.1)
PROT SERPL-MCNC: 6.2 GM/DL (ref 6–8.4)
RBC # BLD AUTO: 4.88 M/UL (ref 4.6–6.2)
RELATIVE EOSINOPHIL (SMH): 4.4 % (ref 0–8)
RELATIVE LYMPHOCYTE (SMH): 39.9 % (ref 18–48)
RELATIVE MONOCYTE (SMH): 10.6 % (ref 4–15)
RELATIVE NEUTROPHIL (SMH): 43.4 % (ref 38–73)
SODIUM SERPL-SCNC: 138 MMOL/L (ref 136–145)
WBC # BLD AUTO: 7.44 K/UL (ref 3.9–12.7)

## 2025-04-15 PROCEDURE — 99223 1ST HOSP IP/OBS HIGH 75: CPT | Mod: ,,, | Performed by: SURGERY

## 2025-04-15 PROCEDURE — 80053 COMPREHEN METABOLIC PANEL: CPT

## 2025-04-15 PROCEDURE — 83735 ASSAY OF MAGNESIUM: CPT

## 2025-04-15 PROCEDURE — G0378 HOSPITAL OBSERVATION PER HR: HCPCS

## 2025-04-15 PROCEDURE — 25000003 PHARM REV CODE 250: Performed by: INTERNAL MEDICINE

## 2025-04-15 PROCEDURE — 97161 PT EVAL LOW COMPLEX 20 MIN: CPT

## 2025-04-15 PROCEDURE — 85730 THROMBOPLASTIN TIME PARTIAL: CPT

## 2025-04-15 PROCEDURE — 25000003 PHARM REV CODE 250

## 2025-04-15 PROCEDURE — 85025 COMPLETE CBC W/AUTO DIFF WBC: CPT

## 2025-04-15 PROCEDURE — 97165 OT EVAL LOW COMPLEX 30 MIN: CPT

## 2025-04-15 PROCEDURE — 36415 COLL VENOUS BLD VENIPUNCTURE: CPT

## 2025-04-15 PROCEDURE — S4991 NICOTINE PATCH NONLEGEND: HCPCS

## 2025-04-15 PROCEDURE — 83605 ASSAY OF LACTIC ACID: CPT

## 2025-04-15 PROCEDURE — 82248 BILIRUBIN DIRECT: CPT

## 2025-04-15 PROCEDURE — 84100 ASSAY OF PHOSPHORUS: CPT

## 2025-04-15 RX ORDER — PROCHLORPERAZINE EDISYLATE 5 MG/ML
5 INJECTION INTRAMUSCULAR; INTRAVENOUS EVERY 6 HOURS PRN
Status: DISCONTINUED | OUTPATIENT
Start: 2025-04-15 | End: 2025-04-15 | Stop reason: HOSPADM

## 2025-04-15 RX ORDER — LANOLIN ALCOHOL/MO/W.PET/CERES
800 CREAM (GRAM) TOPICAL
Status: DISCONTINUED | OUTPATIENT
Start: 2025-04-15 | End: 2025-04-15 | Stop reason: HOSPADM

## 2025-04-15 RX ORDER — TALC
9 POWDER (GRAM) TOPICAL NIGHTLY PRN
Status: DISCONTINUED | OUTPATIENT
Start: 2025-04-15 | End: 2025-04-15 | Stop reason: HOSPADM

## 2025-04-15 RX ORDER — SODIUM CHLORIDE 0.9 % (FLUSH) 0.9 %
10 SYRINGE (ML) INJECTION EVERY 12 HOURS PRN
Status: DISCONTINUED | OUTPATIENT
Start: 2025-04-15 | End: 2025-04-15 | Stop reason: HOSPADM

## 2025-04-15 RX ORDER — NALOXONE HCL 0.4 MG/ML
0.02 VIAL (ML) INJECTION
Status: DISCONTINUED | OUTPATIENT
Start: 2025-04-15 | End: 2025-04-15 | Stop reason: HOSPADM

## 2025-04-15 RX ORDER — IBUPROFEN 200 MG
16 TABLET ORAL
Status: DISCONTINUED | OUTPATIENT
Start: 2025-04-15 | End: 2025-04-15 | Stop reason: HOSPADM

## 2025-04-15 RX ORDER — ONDANSETRON HYDROCHLORIDE 2 MG/ML
4 INJECTION, SOLUTION INTRAVENOUS EVERY 6 HOURS PRN
Status: DISCONTINUED | OUTPATIENT
Start: 2025-04-15 | End: 2025-04-15 | Stop reason: HOSPADM

## 2025-04-15 RX ORDER — ACETAMINOPHEN 325 MG/1
650 TABLET ORAL EVERY 4 HOURS PRN
Status: DISCONTINUED | OUTPATIENT
Start: 2025-04-15 | End: 2025-04-15 | Stop reason: HOSPADM

## 2025-04-15 RX ORDER — GLUCAGON 1 MG
1 KIT INJECTION
Status: DISCONTINUED | OUTPATIENT
Start: 2025-04-15 | End: 2025-04-15 | Stop reason: HOSPADM

## 2025-04-15 RX ORDER — PRAVASTATIN SODIUM 10 MG/1
20 TABLET ORAL DAILY
Status: DISCONTINUED | OUTPATIENT
Start: 2025-04-15 | End: 2025-04-15 | Stop reason: HOSPADM

## 2025-04-15 RX ORDER — AMOXICILLIN 250 MG
1 CAPSULE ORAL 2 TIMES DAILY PRN
Status: DISCONTINUED | OUTPATIENT
Start: 2025-04-15 | End: 2025-04-15 | Stop reason: HOSPADM

## 2025-04-15 RX ORDER — PRAVASTATIN SODIUM 10 MG/1
20 TABLET ORAL DAILY
Status: DISCONTINUED | OUTPATIENT
Start: 2025-04-15 | End: 2025-04-15

## 2025-04-15 RX ORDER — IBUPROFEN 200 MG
1 TABLET ORAL DAILY
Status: DISCONTINUED | OUTPATIENT
Start: 2025-04-15 | End: 2025-04-15 | Stop reason: HOSPADM

## 2025-04-15 RX ORDER — IBUPROFEN 200 MG
24 TABLET ORAL
Status: DISCONTINUED | OUTPATIENT
Start: 2025-04-15 | End: 2025-04-15 | Stop reason: HOSPADM

## 2025-04-15 RX ORDER — SODIUM,POTASSIUM PHOSPHATES 280-250MG
1 POWDER IN PACKET (EA) ORAL ONCE
Status: DISCONTINUED | OUTPATIENT
Start: 2025-04-15 | End: 2025-04-15

## 2025-04-15 RX ORDER — MORPHINE SULFATE 2 MG/ML
2 INJECTION, SOLUTION INTRAMUSCULAR; INTRAVENOUS EVERY 4 HOURS PRN
Refills: 0 | Status: DISCONTINUED | OUTPATIENT
Start: 2025-04-15 | End: 2025-04-15 | Stop reason: HOSPADM

## 2025-04-15 RX ADMIN — NICOTINE 1 PATCH: 14 PATCH TRANSDERMAL at 09:04

## 2025-04-15 RX ADMIN — SODIUM PHOSPHATE, MONOBASIC, MONOHYDRATE AND SODIUM PHOSPHATE, DIBASIC, ANHYDROUS 15 MMOL: 142; 276 INJECTION, SOLUTION INTRAVENOUS at 09:04

## 2025-04-15 RX ADMIN — ACETAMINOPHEN 650 MG: 325 TABLET ORAL at 02:04

## 2025-04-15 NOTE — PT/OT/SLP EVAL
"Occupational Therapy   Evaluation and Discharge Note    Name: Javid Atwood  MRN: [id number]  Admitting Diagnosis: Right upper quadrant abdominal pain  Recent Surgery: * No surgery found *      Recommendations:     Discharge Recommendations: No Therapy Indicated  Discharge Equipment Recommendations: none  Barriers to discharge:       Assessment:     Javid Atwood is a 69 y.o. male with a medical diagnosis of Right upper quadrant abdominal pain. At this time, patient is functioning at their prior level of function except for "a little pain" and does not require further acute OT services.     Plan:     During this hospitalization, patient does not require further acute OT services.  Please re-consult if situation changes.    Plan of Care Reviewed with: patient, spouse    Subjective     Chief Complaint: a little pain  Patient/Family Comments/goals: To go home    Occupational Profile:  Living Environment: Pt lives with spouse in 1 story home with no THEA. Pt has a tub/shower and standard toilet.  Previous level of function: Independent  Roles and Routines: Spouse  Equipment Used at home: none  Assistance upon Discharge: Spouse    Pain/Comfort:  Pain Rating 1:  (Not rated, but stated just a little)  Location - Side 1: Right  Location - Orientation 1: upper  Location 1: upper quadrant    Patients cultural, spiritual, Denominational conflicts given the current situation:      Objective:     Communicated with: Nurse Patterson prior to session.  Patient found supine with peripheral IV, telemetry upon OT entry to room.    General Precautions: Standard, fall  Orthopedic Precautions: N/A  Braces: N/A  Respiratory Status: Room air     Occupational Performance:    Bed Mobility:    Patient completed Supine to Sit with independence  Patient completed Sit to Supine with independence    Functional Mobility/Transfers:  Patient completed Sit <> Stand Transfer with independence  with  no assistive device   Functional Mobility: Pt " ambulated in room with no AD and no loss of balance    Activities of Daily Living:  Feeding:  independence    Grooming: independence    Upper Body Dressing: independence    Lower Body Dressing: independence    Toileting: independence      Cognitive/Visual Perceptual:  Pt alert and oriented    Physical Exam:  Upper Extremity Strength:    -       Right Upper Extremity: WNL  -       Left Upper Extremity: WNL    AMPAC 6 Click ADL:  AMPAC Total Score: 24    Treatment & Education:  OT provided education in role of OT. Patient verbalized understanding and participated in OT.  OT provided instruction in home safety  with ADL/IADL including review of home set up and DME/AE. Patient/spouse verbalized understanding.   OT provided education in calling for assist. Patient verbalized understanding.        Patient left supine with all lines intact, call button in reach, and spouse  present    GOALS:   Multidisciplinary Problems       Occupational Therapy Goals       Not on file                    DME Justifications:  No DME recommended requiring DME justifications    History:     Past Medical History:   Diagnosis Date    Numbness and tingling of both feet          Past Surgical History:   Procedure Laterality Date    TOTAL KNEE  PROSTHESIS REMOVAL W/ SPACER INSERTION Right     TUMOR REMOVAL Left     left thigh       Time Tracking:     OT Date of Treatment: 04/15/25  OT Start Time: 1058  OT Stop Time: 1106  OT Total Time (min): 8 min    Billable Minutes:Evaluation 8    4/15/2025

## 2025-04-15 NOTE — ED NOTES
12/21/22 1144   OTHER   Discipline occupational therapist   Rehab Time/Intention   Session Not Performed   (Pt pending hospital d/c. Pt feels comfortable d/c home with HH. No futher IP OT needs at this time.)   Recommendation   OT - Next Appointment 12/22/22      Assumed care:  Javid Atwood is awake, alert and oriented x 3, skin warm and dry, in NAD.  Patient CO right sided CP off and on for some time but states pain is worse today only with deep breathing.  Denies SOB or cough.  Patient placed on cardiac monitor and continuous pulse ox.    Patient identifiers for Javid Atwood checked and correct.  LOC:  Javid Atwood is awake, alert, and aware of environment with an appropriate affect. He is oriented x 3 and speaking appropriately.  APPEARANCE:  He is resting comfortably and in no acute distress. He is clean and well groomed, patient's clothing is properly fastened.  SKIN:  The skin is warm and dry. He has normal skin turgor and moist mucus membranes. Skin is intact; no bruising or breakdown noted.  MUSCULOSKELETAL:  He is moving all extremities well, no obvious deformities noted. Pulses intact.   RESPIRATORY:  Airway is open and patent. Respirations are spontaneous and non-labored with normal effort and rate.  CARDIAC:  He has a normal rate and rhythm. No peripheral edema noted. Capillary refill < 3 seconds.  ABDOMEN:  No distention noted.  Soft and non-tender upon palpation.  NEUROLOGICAL:  PERRL. Facial expression is symmetrical. Hand grasps are equal bilaterally. Normal sensation in all extremities when touched with finger.  Allergies reported:  Review of patient's allergies indicates:  No Known Allergies

## 2025-04-15 NOTE — CONSULTS
[facility]/Surg  General Surgery  Consult Note    Inpatient consult to General surgery  Consult performed by: Ward Leonardo III, MD  Consult ordered by: Mary Badillo DNP  Reason for consult: Pneumatosis        Subjective:     Chief Complaint/Reason for Admission:  Surgery consult for pneumatosis, liver cysts    History of Present Illness:  69-year-old male admitted overnight with multiple irregular liver cysts, pneumatosis involving the cecum and mild mesenteric swirling.  Patient presented with 2 week history of persistent right lateral chest wall pain.  Denied any trauma or any previous instrumentation.  No fevers or chills.  No cough or productive sputum.  No shortness of breath.  Chest x-ray negative for any pulmonary or rib abnormality.  There was evidence of remote vertebral compression fracture.  CT abdomen pelvis showed multiple irregular liver cysts including a 1.8 cm right inferior lobe lesion that would require further characterization. There is cecal pneumatosis without wall thickening or inflammation. There is mild mesenteric swirling without evidence bowel distention or obstructive picture. He reports no abdominal pain. No distention. No nausea. Having good bowel function. He does report some early satiety over the past 6 months with about a 5- 10 pound weight loss. Last colonoscopy less than 10 years ago per patient. Does have history of polyps. No previous abdominal surgery.     No current facility-administered medications on file prior to encounter.     Current Outpatient Medications on File Prior to Encounter   Medication Sig    aspirin (ECOTRIN) 81 MG EC tablet Take 81 mg by mouth once daily.    pravastatin (PRAVACHOL) 20 MG tablet Take 1 tablet by mouth once daily.       Review of patient's allergies indicates:  No Known Allergies    Past Medical History:   Diagnosis Date    Numbness and tingling of both feet      Past Surgical History:   Procedure Laterality Date     TOTAL KNEE  PROSTHESIS REMOVAL W/ SPACER INSERTION Right     TUMOR REMOVAL Left     left thigh     Family History       Problem Relation (Age of Onset)    Bone cancer Mother    COPD Father    Lymphoma Mother    Prostate cancer Father          Tobacco Use    Smoking status: Every Day     Current packs/day: 1.00     Average packs/day: 1 pack/day for 40.0 years (40.0 ttl pk-yrs)     Types: Cigarettes    Smokeless tobacco: Never   Substance and Sexual Activity    Alcohol use: No    Drug use: No    Sexual activity: Yes     Partners: Female     Review of Systems   Constitutional:  Negative for appetite change, chills, fever and unexpected weight change.   HENT:  Negative for hearing loss, rhinorrhea, sore throat and voice change.    Eyes:  Negative for photophobia and visual disturbance.   Respiratory:  Negative for cough, choking and shortness of breath.    Cardiovascular:  Negative for chest pain, palpitations and leg swelling.   Gastrointestinal:  Negative for abdominal pain, blood in stool, constipation, diarrhea, nausea and vomiting.   Endocrine: Negative for cold intolerance, heat intolerance, polydipsia and polyuria.   Musculoskeletal:  Negative for arthralgias, back pain, joint swelling and neck stiffness.   Skin:  Negative for color change, pallor and rash.   Neurological:  Negative for dizziness, seizures, syncope and headaches.   Hematological:  Negative for adenopathy. Does not bruise/bleed easily.   Psychiatric/Behavioral:  Negative for agitation, behavioral problems and confusion.      Objective:     Vital Signs (Most Recent):  Temp: 98 °F (36.7 °C) (04/15/25 1552)  Pulse: (!) 47 (04/15/25 1552)  Resp: 16 (04/15/25 1552)  BP: 112/65 (04/15/25 1552)  SpO2: 97 % (04/15/25 1552) Vital Signs (24h Range):  Temp:  [97.7 °F (36.5 °C)-98.2 °F (36.8 °C)] 98 °F (36.7 °C)  Pulse:  [42-53] 47  Resp:  [16-21] 16  SpO2:  [95 %-100 %] 97 %  BP: (112-165)/(65-98) 112/65     Weight: 84.5 kg (186 lb 4.6 oz)  Body mass index  is 27.51 kg/m².    No intake or output data in the 24 hours ending 04/15/25 1611    Physical Exam  Constitutional:       General: He is not in acute distress.     Appearance: He is not toxic-appearing.   Pulmonary:      Effort: No tachypnea or bradypnea.   Chest:          Comments: Point tender lower two lateral ribs. No skin change.   Abdominal:      General: There is no distension.      Palpations: Abdomen is soft.      Tenderness: There is no abdominal tenderness.      Comments: Abd is soft. Non distended non tender    Neurological:      Mental Status: He is alert and oriented to person, place, and time.   Psychiatric:         Behavior: Behavior is cooperative.         Significant Labs:  CBC:   Recent Labs   Lab 04/15/25  0239   WBC 7.44   RBC 4.88   HGB 14.0   HCT 42.5      MCV 87   MCH 28.7   MCHC 32.9     CMP:   Recent Labs   Lab 04/15/25  0239   CALCIUM 8.5*   ALBUMIN 3.7      K 4.6   CO2 25   BUN 17   CREATININE 1.1   ALKPHOS 83   ALT <8*   AST 17   BILITOT 0.2       Significant Diagnostics:  I have reviewed all pertinent imaging results/findings within the past 24 hours.    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 I am comfortable with observation regarding the pneumatosis. I would like to get an interval CT in a few days to follow up outpatient. Will also closer evaluate the 1.8 cm right liver lesion with liver protocol CT. I do not have great explanation for the focal right chest wall pain.       Thank you for your consult.     Ward Leonardo III, MD  General Surgery  [facility]/Surg

## 2025-04-15 NOTE — PT/OT/SLP EVAL
Physical Therapy Evaluation and Discharge Note    Patient Name:  Javid Atwood   MRN:  [id number]    Recommendations:     Discharge Recommendations: No Therapy Indicated  Discharge Equipment Recommendations: none   Barriers to discharge: None    Assessment:     Javid Atwood is a 69 y.o. male admitted with a medical diagnosis of Right upper quadrant abdominal pain. .  At this time, patient is functioning at their prior level of function and does not require further acute PT services.     Recent Surgery: * No surgery found *      Plan:     During this hospitalization, patient does not require further acute PT services.  Please re-consult if situation changes.      Subjective     Chief Complaint: pain  Patient/Family Comments/goals: go home  Pain/Comfort:  Pain Rating 1: 2/10  Location - Side 1: Bilateral  Location - Orientation 1: anterior  Location 1: chest  Pain Addressed 1: Reposition, Cessation of Activity  Pain Rating Post-Intervention 1: 2/10    Patients cultural, spiritual, Yazidism conflicts given the current situation:      Living Environment:  Currently lives with spouse in [address] [place] home.  Prior to admission, patients level of function was independent.  Equipment used at home: none.  DME owned (not currently used): none.  Upon discharge, patient will have assistance from spouse.    Objective:     Communicated with nurse prior to session.  Patient found supine with telemetry upon PT entry to room.    General Precautions: Standard, fall    Orthopedic Precautions:N/A   Braces:    Respiratory Status: Room air    Exams:  RLE ROM: WFL  RLE Strength: WNL  LLE ROM: WFL  LLE Strength: WNL    Functional Mobility:  Bed Mobility:     Supine to Sit: independence  Sit to Supine: independence  Transfers:     Sit to Stand:  independence with no AD  Gait: independent    AM-PAC 6 CLICK MOBILITY  Total Score:24       Treatment and Education:  None given    AM-PAC 6 CLICK MOBILITY  Total Score:24     Patient left  supine with call button in reach, nurse notified, and spouse present.    GOALS:   Multidisciplinary Problems       Physical Therapy Goals       Not on file                    DME Justifications:  No DME recommended requiring DME justifications    History:     Past Medical History:   Diagnosis Date    Numbness and tingling of both feet        Past Surgical History:   Procedure Laterality Date    TOTAL KNEE  PROSTHESIS REMOVAL W/ SPACER INSERTION Right     TUMOR REMOVAL Left     left thigh       Time Tracking:     PT Received On: 04/15/25  PT Start Time: 1331     PT Stop Time: 1344  PT Total Time (min): 13 min     Billable Minutes: Evaluation 13      04/15/2025

## 2025-04-15 NOTE — HPI
Javid Atwood is a 69 year old male with a past medical current tobacco dependency, and neuropathy in bilateral feet presents with complaints of severe intermittent right upper abdominal pain for 4 days. Patient has difficulty explaining nature of pain. He states he has chest pain but points to his right upper quadrant.  He denies history of ETOH abuse.  He reports he has had a known liver lesion for over 10-15 years.  He [facility] and was on gabapentin for neuropathy without any relief.  He denies shortness of breath, fever, chills, vision changes, lightheadedness, headaches, urinary/bowel complications, nausea, or vomiting.  He denies any complications with eating.  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  CBC and BNP unremarkable.  D-dimer 0.47.  LFTs within normal limits.  ED MD spoke with Dr. Leonardo recommends admission and will see in a.m. for possible follow-up imaging.

## 2025-04-15 NOTE — SUBJECTIVE & OBJECTIVE
Past Medical History:   Diagnosis Date    Numbness and tingling of both feet        Past Surgical History:   Procedure Laterality Date    TOTAL KNEE  PROSTHESIS REMOVAL W/ SPACER INSERTION Right     TUMOR REMOVAL Left     left thigh       Review of patient's allergies indicates:  No Known Allergies    No current facility-administered medications on file prior to encounter.     Current Outpatient Medications on File Prior to Encounter   Medication Sig    aspirin (ECOTRIN) 81 MG EC tablet Take 81 mg by mouth once daily.    pravastatin (PRAVACHOL) 20 MG tablet Take 1 tablet by mouth once daily.     Family History       Problem Relation (Age of Onset)    Bone cancer Mother    COPD Father    Lymphoma Mother    Prostate cancer Father          Tobacco Use    Smoking status: Every Day     Current packs/day: 1.00     Average packs/day: 1 pack/day for 40.0 years (40.0 ttl pk-yrs)     Types: Cigarettes    Smokeless tobacco: Never   Substance and Sexual Activity    Alcohol use: No    Drug use: No    Sexual activity: Yes     Partners: Female     Review of Systems   Constitutional:  Negative for activity change, appetite change, chills, diaphoresis, fatigue, fever and unexpected weight change.   Respiratory:  Negative for cough and shortness of breath.    Cardiovascular:  Negative for chest pain.   Gastrointestinal:  Positive for abdominal pain (Right upper quadrant). Negative for abdominal distention, constipation, diarrhea, nausea and vomiting.   Genitourinary:  Negative for difficulty urinating, flank pain and hematuria.   Musculoskeletal:  Negative for back pain.   Neurological:  Positive for numbness (chronic neuropathy bilateral feet). Negative for dizziness, tremors, seizures, syncope, facial asymmetry, speech difficulty, light-headedness and headaches.     Objective:     Vital Signs (Most Recent):  Temp: 97.8 °F (36.6 °C) (04/14/25 1927)  Pulse: (!) 46 (04/15/25 0230)  Resp: 18 (04/15/25 0230)  BP: (!) 154/98 (04/15/25  0230)  SpO2: 99 % (04/15/25 0230) Vital Signs (24h Range):  Temp:  [97.8 °F (36.6 °C)] 97.8 °F (36.6 °C)  Pulse:  [44-53] 46  Resp:  [16-21] 18  SpO2:  [95 %-100 %] 99 %  BP: (134-154)/(86-98) 154/98     Weight: 82.6 kg (182 lb)  Body mass index is 26.88 kg/m².     Physical Exam  Vitals and nursing note reviewed.   Constitutional:       Appearance: He is not ill-appearing.   Eyes:      Pupils: Pupils are equal, round, and reactive to light.   Cardiovascular:      Rate and Rhythm: Regular rhythm. Bradycardia present.      Pulses: Normal pulses.      Heart sounds: Normal heart sounds.   Pulmonary:      Effort: Pulmonary effort is normal. No respiratory distress.      Breath sounds: Normal breath sounds. No wheezing.   Abdominal:      General: Bowel sounds are normal. There is no distension.      Palpations: Abdomen is soft.      Tenderness: There is abdominal tenderness in the right upper quadrant. There is guarding.   Musculoskeletal:      Right lower leg: No edema.      Left lower leg: No edema.   Skin:     General: Skin is warm.      Capillary Refill: Capillary refill takes less than 2 seconds.   Neurological:      Mental Status: He is alert and oriented to person, place, and time.      GCS: GCS eye subscore is 4. GCS verbal subscore is 5. GCS motor subscore is 6.              CRANIAL NERVES     CN III, IV, VI   Pupils are equal, round, and reactive to light.       Significant Labs: All pertinent labs within the past 24 hours have been reviewed.    Bilirubin:   Recent Labs   Lab 04/14/25 2030   BILITOT 0.2       BMP:   Recent Labs   Lab 04/14/25 2030      K 4.5   CO2 24   BUN 19   CREATININE 1.1   CALCIUM 8.6*     CBC:   Recent Labs   Lab 04/14/25  2030 04/15/25  0239   WBC 7.88 7.44   HGB 13.9* 14.0   HCT 42.3 42.5    218     CMP:   Recent Labs   Lab 04/14/25 2030      K 4.5   CO2 24   BUN 19   CREATININE 1.1   CALCIUM 8.6*   ALBUMIN 3.8   BILITOT 0.2   ALKPHOS 96   AST 17   ALT 11        Lipase:   Recent Labs   Lab 04/14/25 2030   LIPASE 22       Troponin:   Recent Labs   Lab 04/14/25 2030   TROPONINIHS <3       Urine Studies:   Recent Labs   Lab 04/14/25 2041   APPEARANCEUA Clear   SPECGRAV >=1.030*   PROTEINUA Trace*   BILIRUBINUA Negative   UROBILINOGEN 2.0-3.0*   LEUKOCYTESUR Negative       Significant Imaging: I have reviewed all pertinent imaging results/findings within the past 24 hours.    CT Abdomen Pelvis With IV Contrast NO Oral Contrast  Order: [id number]   Status: Final result       Next appt: None    Test Result Released: Yes (seen)    0 Result Notes  Details    Reading Physician Reading Date Result Priority   Summer Gar MD  [phone]  4/15/2025 STAT     Narrative & Impression  EXAMINATION:  CT ABDOMEN PELVIS WITH IV CONTRAST     CLINICAL HISTORY:  Epigastric pain;     TECHNIQUE:  Low dose axial images, sagittal and coronal reformations were obtained from the lung bases to the pubic symphysis following the IV administration of 75 mL of Omnipaque 350 .  Oral contrast was not administered.     COMPARISON:  None.     FINDINGS:  Abdomen:     - Lower thorax:Visualized heart is not significantly enlarged.  There is no pericardial effusion.  The lung bases appear clear allowing for mild dependent atelectasis.     - Liver: There are multiple low densities scattered throughout the liver with well-defined but lobular border along the larger dominant cystic focus in the right lobe inferiorly measuring 2.6 cm in maximal dimension.  Multiple small low densities are too small to characterize.  There is a more ill-defined area of low density measuring 1.8 cm axial image 59 series 2 within the right lobe inferiorly which cannot be well evaluated.  It possibly represents a hemangioma however this not state with certainty.  There is no evidence of hepatomegaly or cirrhosis.     - Gallbladder: Gallbladder is contracted and otherwise unremarkable with no pericholecystic inflammation  seen.     - Bile Ducts: There is prominence of the common bile duct measuring up to 10 mm and tapering is noted more distally within the pancreatic head there is no intrahepatic ductal dilatation.     The pancreas enhances homogeneously.  There is no pancreatic duct dilatation.     - Spleen: Negative.     - Kidneys: Renal cortices enhance symmetrically.  There is note of cortical scarring in the left kidney mostly along the upper pole.  In the right kidney there is a 1.4 cm cyst pedunculated off the lateral cortex with borderline complex Hounsfield unit measurements of 19.  In the left kidney lateral cortex there is a 1.9 cm low density with Hounsfield unit measurements of 33 raising question of complex/hemorrhagic cyst.  There are smaller low densities too small to characterize and there is also a simple cyst in the upper pole on the left.  There is no nephrolithiasis or hydro ureterectasis bilaterally.     - Adrenals: There is a nodule in left adrenal gland measuring 1.5 x 2.1 cm which does not demonstrate fat Hounsfield unit measurements appearing nonspecific but well-defined without associated calcification.  Right adrenal gland is normal.     - Retroperitoneum:  No significant adenopathy.     - Vascular: There is mild infrarenal focal aortic ectasia measuring up to 2.5 cm without jaci aneurysm terminating above the iliac bifurcation.  Minor scattered atherosclerosis.     - Abdominal wall:  Unremarkable.     Pelvis:     The bladder demonstrates mild wall thickening which may be related to under distension.  Prostate is borderline mildly prominent with central calcifications.  There is no free fluid or adenopathy in the pelvis.     Bowel/Mesentery:     The appendix is not conspicuous noting no evidence of appendicitis.  There is cecal pneumatosis without evidence of wall thickening, mesenteric inflammation or other concerning abnormality likely benign.  Diverticulosis of the distal colon without diverticulitis.   There is note of swirling of the mesentery around its root inverting the SMA and SMV however there is no evidence of small bowel distension or bowel ischemia allowing for the pneumatosis.  There are no edematous changes in the mesentery and there is no mesenteric adenopathy.     Bones:  No acute osseous abnormality and no suspicious lytic or blastic lesion.  Moderate to severe spondylosis at L4-L5 and L5-S1 with minor 2 mm L5 anterolisthesis relative to S1.     Impression:     Multiple hepatic cystic lesions with multilocular appearance.  The dominant cysts do demonstrate simple Hounsfield unit measurements but have lobular margins somewhat atypical in appearance.  There is also an ill-defined lesion in the right lobe of the liver inferiorly measuring 1.8 cm overall which is nonspecific possibly represents a hemangioma but this cannot be stated with certainty.  Follow-up is suggested with dynamic imaging for hemangioma on CT or MRI.     Some swirling of the mesentery along the axis without convincing evidence of bowel obstruction, acute bowel ischemia possibly related to some tethering of bowel is questioned noting partial nonobstructing midgut volvulus can have this appearance.  There is benign-appearing pneumatosis in the cecum at this time noting no evidence of significant finding otherwise to suggest bowel ischemia.     Left adrenal nodule may represent a lipid poor adenoma but is nonspecific with follow-up suggested to exclude more aggressive nodule.     Hepatic cysts bilaterally with 2 noted to be complex possibly hemorrhagic 1 on the right and 1 on the left.  Follow-up suggested to ensure stability.     Please see above for additional incidental nonacute findings.     This report was flagged in Epic as abnormal.        Electronically signed by:Summer Gar  Date:                                            04/15/2025  Time:                                           01:07        Exam Ended: 04/14/25 22:15  CDT Last Resulted: 04/15/25 01:07 CDT     X-Ray Chest PA And Lateral  Order: [id number]   Status: Final result       Next appt: None       Dx: RUQ abdominal pain    Test Result Released: Yes (seen)    0 Result Notes  Details    Reading Physician Reading Date Result Priority   Summer Gar MD  [phone]  4/14/2025 STAT     Narrative & Impression  EXAMINATION:  XR CHEST PA AND LATERAL     CLINICAL HISTORY:  Right upper quadrant pain     TECHNIQUE:  PA and lateral views of the chest were performed.     COMPARISON:  04/20/2023, 11/12/2021 chest x-ray     FINDINGS:  Cardiac silhouette is stable and nonenlarged.  The lungs appear clear.  There is no evidence of a pleural effusion or pneumothorax.  Imaged osseous structures are stable with mild wedging of 2 adjacent midthoracic vertebra appearing stable suggesting mild remote compression fractures.     Impression:     No acute or significant focal abnormality involving the mediastinum or lungs.     Mild wedging of 2 adjacent midthoracic vertebrae suggesting chronic mild compression.        Electronically signed by:Summer Gar  Date:                                            04/14/2025  Time:                                           22:54        Exam Ended: 04/14/25 20:28 CDT Last Resulted: 04/14/25 22:54 CDT

## 2025-04-15 NOTE — PLAN OF CARE
Pt clear for DC from case management standpoint. Discharging to home.  Contacted PCP office to schedule hospital follow up. States unable to schedule appointment, they sent message to PCP office to contact pt to schedule hospital follow up.  Inbasket message sent to gen surg office to contact pt to schedule hospital follow up.  Office information added to AVS.     04/15/25 3616   Final Note   Assessment Type Final Discharge Note   Anticipated Discharge Disposition Home   Hospital Resources/Appts/Education Provided Appointment suggestion unavailable

## 2025-04-15 NOTE — PLAN OF CARE
POC/Meds reviewed, pt verbalized understanding. Vitals stable.  Afebrile.   Tele In place-[id number].  Up with SB assist. Repositions self. Hourly/Q2hr rounding performed, safety maintained. Bed in lowest position, wheels locked, SR up x2, call light in easy reach. No  complaints at this time.

## 2025-04-15 NOTE — H&P
[facility] Medicine  History & Physical    Patient Name: Javid Atwood  MRN: [id number]  Patient Class: OP- Observation  Admission Date: 4/14/2025  Attending Physician: Obdulio Prescott DO   [facility]         Patient information was obtained from patient, past medical records, and ER records.     Subjective:     Principal Problem:Right upper quadrant abdominal pain    Chief Complaint:   Chief Complaint   Patient presents with    Abdominal Pain     RUQ pain worse since yesterday. Hurts with inspiration         HPI: Javid Atwood is a 69 year old male with a past medical current tobacco dependency, and neuropathy in bilateral feet presents with complaints of severe intermittent right upper abdominal pain for 4 days. Patient has difficulty explaining nature of pain. He states he has chest pain but points to his right upper quadrant.  He denies history of ETOH abuse.  He reports he has had a known liver lesion for over 10-15 years.  [facility] and was on gabapentin for neuropathy without any relief.  He denies shortness of breath, fever, chills, vision changes, lightheadedness, headaches, urinary/bowel complications, nausea, or vomiting.  He denies any complications with eating.  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  CBC and BNP unremarkable.  D-dimer 0.47.  LFTs within normal limits.  ED MD spoke with Dr. Leonardo recommends admission and will see in a.m. for possible follow-up imaging.                Past Medical History:   Diagnosis Date    Numbness and tingling of both feet        Past Surgical History:   Procedure Laterality  Date    TOTAL KNEE  PROSTHESIS REMOVAL W/ SPACER INSERTION Right     TUMOR REMOVAL Left     left thigh       Review of patient's allergies indicates:  No Known Allergies    No current facility-administered medications on file prior to encounter.     Current Outpatient Medications on File Prior to Encounter   Medication Sig    aspirin (ECOTRIN) 81 MG EC tablet Take 81 mg by mouth once daily.    pravastatin (PRAVACHOL) 20 MG tablet Take 1 tablet by mouth once daily.     Family History       Problem Relation (Age of Onset)    Bone cancer Mother    COPD Father    Lymphoma Mother    Prostate cancer Father          Tobacco Use    Smoking status: Every Day     Current packs/day: 1.00     Average packs/day: 1 pack/day for 40.0 years (40.0 ttl pk-yrs)     Types: Cigarettes    Smokeless tobacco: Never   Substance and Sexual Activity    Alcohol use: No    Drug use: No    Sexual activity: Yes     Partners: Female     Review of Systems   Constitutional:  Negative for activity change, appetite change, chills, diaphoresis, fatigue, fever and unexpected weight change.   Respiratory:  Negative for cough and shortness of breath.    Cardiovascular:  Negative for chest pain.   Gastrointestinal:  Positive for abdominal pain (Right upper quadrant). Negative for abdominal distention, constipation, diarrhea, nausea and vomiting.   Genitourinary:  Negative for difficulty urinating, flank pain and hematuria.   Musculoskeletal:  Negative for back pain.   Neurological:  Positive for numbness (chronic neuropathy bilateral feet). Negative for dizziness, tremors, seizures, syncope, facial asymmetry, speech difficulty, light-headedness and headaches.     Objective:     Vital Signs (Most Recent):  Temp: 97.8 °F (36.6 °C) (04/14/25 1927)  Pulse: (!) 46 (04/15/25 0230)  Resp: 18 (04/15/25 0230)  BP: (!) 154/98 (04/15/25 0230)  SpO2: 99 % (04/15/25 0230) Vital Signs (24h Range):  Temp:  [97.8 °F (36.6 °C)] 97.8 °F (36.6 °C)  Pulse:  [44-53] 46  Resp:   [16-21] 18  SpO2:  [95 %-100 %] 99 %  BP: (134-154)/(86-98) 154/98     Weight: 82.6 kg (182 lb)  Body mass index is 26.88 kg/m².     Physical Exam  Vitals and nursing note reviewed.   Constitutional:       Appearance: He is not ill-appearing.   Eyes:      Pupils: Pupils are equal, round, and reactive to light.   Cardiovascular:      Rate and Rhythm: Regular rhythm. Bradycardia present.      Pulses: Normal pulses.      Heart sounds: Normal heart sounds.   Pulmonary:      Effort: Pulmonary effort is normal. No respiratory distress.      Breath sounds: Normal breath sounds. No wheezing.   Abdominal:      General: Bowel sounds are normal. There is no distension.      Palpations: Abdomen is soft.      Tenderness: There is abdominal tenderness in the right upper quadrant. There is guarding.   Musculoskeletal:      Right lower leg: No edema.      Left lower leg: No edema.   Skin:     General: Skin is warm.      Capillary Refill: Capillary refill takes less than 2 seconds.   Neurological:      Mental Status: He is alert and oriented to person, place, and time.      GCS: GCS eye subscore is 4. GCS verbal subscore is 5. GCS motor subscore is 6.              CRANIAL NERVES     CN III, IV, VI   Pupils are equal, round, and reactive to light.       Significant Labs: All pertinent labs within the past 24 hours have been reviewed.    Bilirubin:   Recent Labs   Lab 04/14/25  2030   BILITOT 0.2       BMP:   Recent Labs   Lab 04/14/25 2030      K 4.5   CO2 24   BUN 19   CREATININE 1.1   CALCIUM 8.6*     CBC:   Recent Labs   Lab 04/14/25  2030 04/15/25  0239   WBC 7.88 7.44   HGB 13.9* 14.0   HCT 42.3 42.5    218     CMP:   Recent Labs   Lab 04/14/25 2030      K 4.5   CO2 24   BUN 19   CREATININE 1.1   CALCIUM 8.6*   ALBUMIN 3.8   BILITOT 0.2   ALKPHOS 96   AST 17   ALT 11       Lipase:   Recent Labs   Lab 04/14/25 2030   LIPASE 22       Troponin:   Recent Labs   Lab 04/14/25 2030   TROPONINIHS <3       Urine  Studies:   Recent Labs   Lab 04/14/25 2041   APPEARANCEUA Clear   SPECGRAV >=1.030*   PROTEINUA Trace*   BILIRUBINUA Negative   UROBILINOGEN 2.0-3.0*   LEUKOCYTESUR Negative       Significant Imaging: I have reviewed all pertinent imaging results/findings within the past 24 hours.    CT Abdomen Pelvis With IV Contrast NO Oral Contrast  Order: [id number]   Status: Final result       Next appt: None    Test Result Released: Yes (seen)    0 Result Notes  Details    Reading Physician Reading Date Result Priority   Summer Gar MD  [phone]  4/15/2025 STAT     Narrative & Impression  EXAMINATION:  CT ABDOMEN PELVIS WITH IV CONTRAST     CLINICAL HISTORY:  Epigastric pain;     TECHNIQUE:  Low dose axial images, sagittal and coronal reformations were obtained from the lung bases to the pubic symphysis following the IV administration of 75 mL of Omnipaque 350 .  Oral contrast was not administered.     COMPARISON:  None.     FINDINGS:  Abdomen:     - Lower thorax:Visualized heart is not significantly enlarged.  There is no pericardial effusion.  The lung bases appear clear allowing for mild dependent atelectasis.     - Liver: There are multiple low densities scattered throughout the liver with well-defined but lobular border along the larger dominant cystic focus in the right lobe inferiorly measuring 2.6 cm in maximal dimension.  Multiple small low densities are too small to characterize.  There is a more ill-defined area of low density measuring 1.8 cm axial image 59 series 2 within the right lobe inferiorly which cannot be well evaluated.  It possibly represents a hemangioma however this not state with certainty.  There is no evidence of hepatomegaly or cirrhosis.     - Gallbladder: Gallbladder is contracted and otherwise unremarkable with no pericholecystic inflammation seen.     - Bile Ducts: There is prominence of the common bile duct measuring up to 10 mm and tapering is noted more distally within the  pancreatic head there is no intrahepatic ductal dilatation.     The pancreas enhances homogeneously.  There is no pancreatic duct dilatation.     - Spleen: Negative.     - Kidneys: Renal cortices enhance symmetrically.  There is note of cortical scarring in the left kidney mostly along the upper pole.  In the right kidney there is a 1.4 cm cyst pedunculated off the lateral cortex with borderline complex Hounsfield unit measurements of 19.  In the left kidney lateral cortex there is a 1.9 cm low density with Hounsfield unit measurements of 33 raising question of complex/hemorrhagic cyst.  There are smaller low densities too small to characterize and there is also a simple cyst in the upper pole on the left.  There is no nephrolithiasis or hydro ureterectasis bilaterally.     - Adrenals: There is a nodule in left adrenal gland measuring 1.5 x 2.1 cm which does not demonstrate fat Hounsfield unit measurements appearing nonspecific but well-defined without associated calcification.  Right adrenal gland is normal.     - Retroperitoneum:  No significant adenopathy.     - Vascular: There is mild infrarenal focal aortic ectasia measuring up to 2.5 cm without jaci aneurysm terminating above the iliac bifurcation.  Minor scattered atherosclerosis.     - Abdominal wall:  Unremarkable.     Pelvis:     The bladder demonstrates mild wall thickening which may be related to under distension.  Prostate is borderline mildly prominent with central calcifications.  There is no free fluid or adenopathy in the pelvis.     Bowel/Mesentery:     The appendix is not conspicuous noting no evidence of appendicitis.  There is cecal pneumatosis without evidence of wall thickening, mesenteric inflammation or other concerning abnormality likely benign.  Diverticulosis of the distal colon without diverticulitis.  There is note of swirling of the mesentery around its root inverting the SMA and SMV however there is no evidence of small bowel  distension or bowel ischemia allowing for the pneumatosis.  There are no edematous changes in the mesentery and there is no mesenteric adenopathy.     Bones:  No acute osseous abnormality and no suspicious lytic or blastic lesion.  Moderate to severe spondylosis at L4-L5 and L5-S1 with minor 2 mm L5 anterolisthesis relative to S1.     Impression:     Multiple hepatic cystic lesions with multilocular appearance.  The dominant cysts do demonstrate simple Hounsfield unit measurements but have lobular margins somewhat atypical in appearance.  There is also an ill-defined lesion in the right lobe of the liver inferiorly measuring 1.8 cm overall which is nonspecific possibly represents a hemangioma but this cannot be stated with certainty.  Follow-up is suggested with dynamic imaging for hemangioma on CT or MRI.     Some swirling of the mesentery along the axis without convincing evidence of bowel obstruction, acute bowel ischemia possibly related to some tethering of bowel is questioned noting partial nonobstructing midgut volvulus can have this appearance.  There is benign-appearing pneumatosis in the cecum at this time noting no evidence of significant finding otherwise to suggest bowel ischemia.     Left adrenal nodule may represent a lipid poor adenoma but is nonspecific with follow-up suggested to exclude more aggressive nodule.     Hepatic cysts bilaterally with 2 noted to be complex possibly hemorrhagic 1 on the right and 1 on the left.  Follow-up suggested to ensure stability.     Please see above for additional incidental nonacute findings.     This report was flagged in Epic as abnormal.        Electronically signed by:Summer Gar  Date:                                            04/15/2025  Time:                                           01:07        Exam Ended: 04/14/25 22:15 CDT Last Resulted: 04/15/25 01:07 CDT     X-Ray Chest PA And Lateral  Order: [id number]   Status: Final result       Next appt: None        Dx: RUQ abdominal pain    Test Result Released: Yes (seen)    0 Result Notes  Details    Reading Physician Reading Date Result Priority   Summer Gar MD  [phone]  4/14/2025 STAT     Narrative & Impression  EXAMINATION:  XR CHEST PA AND LATERAL     CLINICAL HISTORY:  Right upper quadrant pain     TECHNIQUE:  PA and lateral views of the chest were performed.     COMPARISON:  04/20/2023, 11/12/2021 chest x-ray     FINDINGS:  Cardiac silhouette is stable and nonenlarged.  The lungs appear clear.  There is no evidence of a pleural effusion or pneumothorax.  Imaged osseous structures are stable with mild wedging of 2 adjacent midthoracic vertebra appearing stable suggesting mild remote compression fractures.     Impression:     No acute or significant focal abnormality involving the mediastinum or lungs.     Mild wedging of 2 adjacent midthoracic vertebrae suggesting chronic mild compression.        Electronically signed by:Summer Gar  Date:                                            04/14/2025  Time:                                           22:54        Exam Ended: 04/14/25 20:28 CDT Last Resulted: 04/14/25 22:54 CDT     Assessment/Plan:     Assessment & Plan  Right upper quadrant abdominal pain  -CT abdomen completed  -consult placed with General surgery notify Dr. Leonardo  -hepatic panel, coags and lactic acid pending  -analgesics and antiemetics p.r.n.  -NPO    Liver lesion  -chronic history noted  -CT abdomen completed    Current smoker  Dangers of cigarette smoking were reviewed with patient in detail for 10 minutes and patient was encouraged to quit. Nicotine replacement options were discussed. Nicotine replacement options were discussed. Nicotine replacement was prescribed.      VTE Risk Mitigation (From admission, onward)           Ordered     IP VTE LOW RISK PATIENT  Once         04/15/25 0209     Place sequential compression device  Until discontinued         04/15/25 0209                                     Mary Badillo, HAYLEY  Department of Hospital Medicine  [facility]

## 2025-04-15 NOTE — ASSESSMENT & PLAN NOTE
-CT abdomen completed  -consult placed with General surgery notify Dr. Leonardo  -hepatic panel, coags and lactic acid pending  -analgesics and antiemetics p.r.n.  -NPO

## 2025-04-15 NOTE — PLAN OF CARE
Patient is a 69 year old male admitted this morning with right upper quadrant abdominal pain. CT abdomen showed two liver lesions, which were previously known. General surgery was consulted and patient was admitted. Pain is still persistent. [facility], lesions seems to be same in size, thought to be hemangiomas, there was no arterial phase contrast uptake.     Discussed with the surgeon. Patient can have a diet. Follow further recommendations   Cont analgesics

## 2025-04-15 NOTE — HOSPITAL COURSE
Patient is a 69 year old male, who was admitted with RUQ/ rib pain. CT abdomen showed two liver lesions, which were previously known. General surgery was consulted and patient was admitted. Pain is still persistent. [facility], lesions seems to be same in size, thought to be hemangiomas, there was no arterial phase contrast uptake. Discussed with surgery, recommended to follow up with rpt imaging in 1 week. Pain is controlled.

## 2025-04-15 NOTE — PLAN OF CARE
Problem: Adult Inpatient Plan of Care  Goal: Plan of Care Review  Outcome: Met  Goal: Patient-Specific Goal (Individualized)  Outcome: Met  Goal: Absence of Hospital-Acquired Illness or Injury  Outcome: Met  Goal: Optimal Comfort and Wellbeing  Outcome: Met  Goal: Readiness for Transition of Care  Outcome: Met   Discharge instructions given to patient.  Patient verbalized complete understanding.  IV discontinued with catheter intact, pressure applied to site and secured with tape.  Patient tolerated well.

## 2025-04-15 NOTE — PLAN OF CARE
[facility] - Med/Surg  Initial Discharge Assessment       Primary Care Provider: [facility]    Admission Diagnosis: Lesion of liver [K76.9]    Admission Date: 4/14/2025  Expected Discharge Date: 4/15/2025    Met with pt at bedside to complete discharge assessment, verified PCP, pharmacy and information on facesheet.  No HH, dialysis or Coumadin.  See DME below.  Pt will drive self home.  No needs identified at this time.    Transition of Care Barriers: None    Payor: [employer] / Plan: [facility] [employer] / Product Type: Government /     Extended Emergency Contact Information  Primary Emergency Contact: Eleanor Atwood  Address: [address]           [place] [address] [facility] [place] [facility] [place]  Home Phone: [phone]  Mobile Phone: [phone]  Relation: Spouse   needed? No    Discharge Plan A: Home         [id number] - [place] - [address]  [address]  [place] [address]  Phone: [phone] Fax: [phone]      Initial Assessment (most recent)       Adult Discharge Assessment - 04/15/25 1436          Discharge Assessment    Assessment Type Discharge Planning Assessment     Confirmed/corrected address, phone number and insurance Yes     Confirmed Demographics Correct on Facesheet     Source of Information patient     Does patient/caregiver understand observation status Yes     Communicated PEPE with patient/caregiver No     People in Home spouse     Do you expect to return to your current living situation? Yes     Prior to hospitilization cognitive status: Alert/Oriented     Current cognitive status: Alert/Oriented     Walking or Climbing Stairs Difficulty no     Dressing/Bathing Difficulty no     Home Accessibility wheelchair accessible     Home Layout Able to live on 1st floor     Equipment Currently Used at Home blood pressure machine;scale     Readmission within 30 days? No     Patient  currently being followed by outpatient case management? No     Do you currently have service(s) that help you manage your care at home? No     Do you take prescription medications? Yes     Do you have prescription coverage? Yes     Coverage PHN     Do you have any problems affording any of your prescribed medications? No     Is the patient taking medications as prescribed? yes     Who is going to help you get home at discharge? self     How do you get to doctors appointments? car, drives self     Are you on dialysis? No     Do you take coumadin? No     Discharge Plan A Home     DME Needed Upon Discharge  none     Discharge Plan discussed with: Patient     Transition of Care Barriers None

## 2025-04-15 NOTE — DISCHARGE SUMMARY
Critical access hospital Medicine  Discharge Summary      Patient Name: Javid Atwood  MRN: [id number]  CLARK: [id number]  Patient Class: OP- Observation  Admission Date: 4/14/2025  Hospital Length of Stay: 0 days  Discharge Date and Time: 04/15/2025 2:31 PM  Attending Physician: Wolf Weinstein MD   Discharging Provider: Wolf Weinstein MD  [facility]: [facility]    Primary Care Team: Networked reference to record PCT     HPI:   Javid Atwood is a 69 year old male with a past medical current tobacco dependency, and neuropathy in bilateral feet presents with complaints of severe intermittent right upper abdominal pain for 4 days. Patient has difficulty explaining nature of pain. He states he has chest pain but points to his right upper quadrant.  He denies history of ETOH abuse.  He reports he has had a known liver lesion for over 10-15 years.  [facility] and was on gabapentin for neuropathy without any relief.  He denies shortness of breath, fever, chills, vision changes, lightheadedness, headaches, urinary/bowel complications, nausea, or vomiting.  He denies any complications with eating.  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  CBC and BNP unremarkable.  D-dimer 0.47.  LFTs within normal limits.  ED MD spoke with Dr. Leonardo recommends admission and will see in a.m. for possible follow-up imaging.                * No surgery found *      Hospital Course:   Patient is a 69 year old male, who was admitted with RUQ/ rib pain. CT abdomen showed two liver lesions, which were previously known. General surgery was consulted and patient was  admitted. Pain is still persistent. [facility], lesions seems to be same in size, thought to be hemangiomas, there was no arterial phase contrast uptake. Discussed with surgery, recommended to follow up with rpt imaging in 1 week. Pain is controlled.      Goals of Care Treatment Preferences:  Code Status: Full Code         Consults:   Consults (From admission, onward)          Status Ordering Provider     Inpatient consult to General surgery  Once        Provider:  Ward Leonardo III, MD    Acknowledged SEAMUS GHOSH            Assessment & Plan  Right upper quadrant abdominal pain  Unclear etiology   Sounds like right rib pain   Pain improved     Liver lesion  -chronic history noted  -CT abdomen completed    Current smoker  Dangers of cigarette smoking were reviewed with patient in detail for 10 minutes and patient was encouraged to quit. Nicotine replacement options were discussed. Nicotine replacement options were discussed. Nicotine replacement was prescribed.      Final Active Diagnoses:    Diagnosis Date Noted POA    PRINCIPAL PROBLEM:  Right upper quadrant abdominal pain [R10.11] 04/15/2025 Yes    Liver lesion [K76.9] 04/15/2025 Yes    Current smoker [F17.200] 04/15/2025 Yes      Problems Resolved During this Admission:       Discharged Condition: good    Disposition: Home or Self Care    Follow Up:   Follow-up Information       Clinic, [facility] Follow up in 1 week(s).    Specialty: Internal Medicine  Contact information:  [address]  [address]  [place] [address] [phone]               Ward Leonardo III, MD Follow up in 1 week(s).    Specialties: General Surgery, Surgery  Contact information:  [address]  [address]  [place] [id number] [phone]                           Patient Instructions:   No discharge procedures on file.    Significant Diagnostic Studies: Labs: CMP   Recent Labs   Lab 04/14/25  2030 04/15/25  0239    138   K 4.5  4.6   CO2 24 25   BUN 19 17   CREATININE 1.1 1.1   CALCIUM 8.6* 8.5*   ALBUMIN 3.8 3.7   BILITOT 0.2 0.2   ALKPHOS 96 83   AST 17 17   ALT 11 <8*    and CBC   Recent Labs   Lab 04/14/25  2030 04/15/25  0239   WBC 7.88 7.44   HGB 13.9* 14.0   HCT 42.3 42.5    218       Pending Diagnostic Studies:       None           Medications:  Reconciled Home Medications:      Medication List        CONTINUE taking these medications      aspirin 81 MG EC tablet  Commonly known as: ECOTRIN  Take 81 mg by mouth once daily.     pravastatin 20 MG tablet  Commonly known as: PRAVACHOL  Take 1 tablet by mouth once daily.              Indwelling Lines/Drains at time of discharge:   Lines/Drains/Airways       None                   Time spent on the discharge of patient: 40 minutes         Wolf Weinstein MD  Department of Hospital Medicine  [facility]/Surg

## 2025-04-15 NOTE — DISCHARGE INSTRUCTIONS
Our goal at [facility] is to always give you outstanding care and exceptional service. You may receive a survey from [employer] by mail, text or e-mail in the next 24-48 hours asking about the care you received with us. The survey should only take 5-10 minutes to complete and is very important to us.     Your feedback provides us with a way to recognize our staff who work tirelessly to provide the best care! Also, your responses help us learn how to improve when your experience was below our aspiration of excellence. We are always looking for ways to improve your stay. We WILL use your feedback to continue making improvements to help us provide the highest quality care. We keep your personal information and feedback confidential. We appreciate your time completing this survey and can't wait to hear from you!!!    We look forward to your continued care with us! Thanks so much for choosing [facility] for your healthcare needs!

## 2025-04-15 NOTE — ED PROVIDER NOTES
Encounter Date: 4/14/2025       History     Chief Complaint   Patient presents with    Abdominal Pain     RUQ pain worse since yesterday. Hurts with inspiration      This is a 69-year-old male presenting with chief complaint of right upper abdominal pain.  He says this began 4 days ago and was intermittent but has been constant since last night.  The pain does not radiate.  It is exacerbated when he breathes deeply.  He denies any shortness a breath or cough.  He denies any nausea, vomiting, diarrhea, constipation, dysuria, or other acute symptoms.  He denies previous abdominal surgeries.  He says he has had this pain in the past but it has never lasted this long or been this severe.    The history is provided by the patient.     Review of patient's allergies indicates:  No Known Allergies  Past Medical History:   Diagnosis Date    Numbness and tingling of both feet      Past Surgical History:   Procedure Laterality Date    TOTAL KNEE  PROSTHESIS REMOVAL W/ SPACER INSERTION Right     TUMOR REMOVAL Left     left thigh     Family History   Problem Relation Name Age of Onset    Bone cancer Mother      Lymphoma Mother      COPD Father      Prostate cancer Father       Social History[1]  Review of Systems   Gastrointestinal:  Positive for abdominal pain.   All other systems reviewed and are negative.      Physical Exam     Initial Vitals [04/14/25 1927]   BP Pulse Resp Temp SpO2   (!) 151/86 (!) 53 (!) 21 97.8 °F (36.6 °C) 100 %      MAP       --         Physical Exam    Nursing note and vitals reviewed.  Constitutional: He appears well-developed and well-nourished. He is not diaphoretic. No distress.   HENT:   Head: Normocephalic and atraumatic.   Eyes: Conjunctivae are normal.   Neck: Neck supple.   Normal range of motion.  Cardiovascular:  Normal rate.           Pulmonary/Chest: No respiratory distress.   Abdominal: He exhibits no distension. There is abdominal tenderness.   Right upper quadrant abdominal tenderness  to palpation   Musculoskeletal:         General: No edema.      Cervical back: Normal range of motion and neck supple.     Neurological: He is alert. He has normal strength.   Skin: Skin is warm and dry. No rash noted. No erythema.   Psychiatric: He has a normal mood and affect.         ED Course   Procedures  Labs Reviewed   COMPREHENSIVE METABOLIC PANEL - Abnormal       Result Value    Sodium 140      Potassium 4.5      Chloride 110      CO2 24      Glucose 106      BUN 19      Creatinine 1.1      Calcium 8.6 (*)     Protein Total 6.5      Albumin 3.8      Bilirubin Total 0.2      ALP 96      AST 17      ALT 11      Anion Gap 6 (*)     eGFR >60     URINALYSIS, REFLEX TO URINE CULTURE - Abnormal    Color, UA Yellow      Appearance, UA Clear      Spec Grav UA >=1.030 (*)     pH, UA 6.0      Protein, UA Trace (*)     Glucose, UA Negative      Ketones, UA Negative      Blood, UA Negative      Bilirubin, UA Negative      Urobilinogen, UA 2.0-3.0 (*)     Nitrites, UA Negative      Leukocyte Esterase, UA Negative     CBC WITH DIFFERENTIAL - Abnormal    WBC 7.88      RBC 4.84      Hgb 13.9 (*)     Hct 42.3      MCV 87      MCH 28.7      MCHC 32.9      RDW 13.3      Platelet Count 231      MPV 9.3      Nucleated RBC 0      Neut % 52.2      Lymph % 31.2      Mono % 11.2      Eos % 3.9      Basophil % 1.1      Imm Grans % 0.4      Neut # 4.1      Lymph # 2.46      Mono # 0.88      Eos # 0.31      Baso # 0.09      Imm Grans # 0.03     LIPASE - Normal    Lipase Level 22     TROPONIN I HIGH SENSITIVITY - Normal    Troponin High Sensitive <3     D DIMER, QUANTITATIVE - Normal    D-Dimer 0.47     CBC W/ AUTO DIFFERENTIAL    Narrative:     The following orders were created for panel order CBC auto differential.  Procedure                               Abnormality         Status                     ---------                               -----------         ------                     CBC with Differential[[id number]]        Abnormal             Final result                 Please view results for these tests on the individual orders.     EKG Readings: (Independently Interpreted)   Sinus rhythm.  Fifty-two beats/minute.  Normal axis.  No ST elevation.       Imaging Results               CT Abdomen Pelvis With IV Contrast NO Oral Contrast (Final result)  Result time 04/15/25 01:07:53      Final result by Summer Gar MD (04/15/25 01:07:53)                   Impression:      Multiple hepatic cystic lesions with multilocular appearance.  The dominant cysts do demonstrate simple Hounsfield unit measurements but have lobular margins somewhat atypical in appearance.  There is also an ill-defined lesion in the right lobe of the liver inferiorly measuring 1.8 cm overall which is nonspecific possibly represents a hemangioma but this cannot be stated with certainty.  Follow-up is suggested with dynamic imaging for hemangioma on CT or MRI.    Some swirling of the mesentery along the axis without convincing evidence of bowel obstruction, acute bowel ischemia possibly related to some tethering of bowel is questioned noting partial nonobstructing midgut volvulus can have this appearance.  There is benign-appearing pneumatosis in the cecum at this time noting no evidence of significant finding otherwise to suggest bowel ischemia.    Left adrenal nodule may represent a lipid poor adenoma but is nonspecific with follow-up suggested to exclude more aggressive nodule.    Hepatic cysts bilaterally with 2 noted to be complex possibly hemorrhagic 1 on the right and 1 on the left.  Follow-up suggested to ensure stability.    Please see above for additional incidental nonacute findings.    This report was flagged in Epic as abnormal.      Electronically signed by: Summer Gar  Date:    04/15/2025  Time:    01:07               Narrative:    EXAMINATION:  CT ABDOMEN PELVIS WITH IV CONTRAST    CLINICAL HISTORY:  Epigastric pain;    TECHNIQUE:  Low dose axial  images, sagittal and coronal reformations were obtained from the lung bases to the pubic symphysis following the IV administration of 75 mL of Omnipaque 350 .  Oral contrast was not administered.    COMPARISON:  None.    FINDINGS:  Abdomen:    - Lower thorax:Visualized heart is not significantly enlarged.  There is no pericardial effusion.  The lung bases appear clear allowing for mild dependent atelectasis.    - Liver: There are multiple low densities scattered throughout the liver with well-defined but lobular border along the larger dominant cystic focus in the right lobe inferiorly measuring 2.6 cm in maximal dimension.  Multiple small low densities are too small to characterize.  There is a more ill-defined area of low density measuring 1.8 cm axial image 59 series 2 within the right lobe inferiorly which cannot be well evaluated.  It possibly represents a hemangioma however this not state with certainty.  There is no evidence of hepatomegaly or cirrhosis.    - Gallbladder: Gallbladder is contracted and otherwise unremarkable with no pericholecystic inflammation seen.    - Bile Ducts: There is prominence of the common bile duct measuring up to 10 mm and tapering is noted more distally within the pancreatic head there is no intrahepatic ductal dilatation.    The pancreas enhances homogeneously.  There is no pancreatic duct dilatation.    - Spleen: Negative.    - Kidneys: Renal cortices enhance symmetrically.  There is note of cortical scarring in the left kidney mostly along the upper pole.  In the right kidney there is a 1.4 cm cyst pedunculated off the lateral cortex with borderline complex Hounsfield unit measurements of 19.  In the left kidney lateral cortex there is a 1.9 cm low density with Hounsfield unit measurements of 33 raising question of complex/hemorrhagic cyst.  There are smaller low densities too small to characterize and there is also a simple cyst in the upper pole on the left.  There is no  nephrolithiasis or hydro ureterectasis bilaterally.    - Adrenals: There is a nodule in left adrenal gland measuring 1.5 x 2.1 cm which does not demonstrate fat Hounsfield unit measurements appearing nonspecific but well-defined without associated calcification.  Right adrenal gland is normal.    - Retroperitoneum:  No significant adenopathy.    - Vascular: There is mild infrarenal focal aortic ectasia measuring up to 2.5 cm without jaci aneurysm terminating above the iliac bifurcation.  Minor scattered atherosclerosis.    - Abdominal wall:  Unremarkable.    Pelvis:    The bladder demonstrates mild wall thickening which may be related to under distension.  Prostate is borderline mildly prominent with central calcifications.  There is no free fluid or adenopathy in the pelvis.    Bowel/Mesentery:    The appendix is not conspicuous noting no evidence of appendicitis.  There is cecal pneumatosis without evidence of wall thickening, mesenteric inflammation or other concerning abnormality likely benign.  Diverticulosis of the distal colon without diverticulitis.  There is note of swirling of the mesentery around its root inverting the SMA and SMV however there is no evidence of small bowel distension or bowel ischemia allowing for the pneumatosis.  There are no edematous changes in the mesentery and there is no mesenteric adenopathy.    Bones:  No acute osseous abnormality and no suspicious lytic or blastic lesion.  Moderate to severe spondylosis at L4-L5 and L5-S1 with minor 2 mm L5 anterolisthesis relative to S1.                                       X-Ray Chest PA And Lateral (Final result)  Result time 04/14/25 22:54:13      Final result by Summer Gar MD (04/14/25 22:54:13)                   Impression:      No acute or significant focal abnormality involving the mediastinum or lungs.    Mild wedging of 2 adjacent midthoracic vertebrae suggesting chronic mild compression.      Electronically signed by: Summer  Rin  Date:    04/14/2025  Time:    22:54               Narrative:    EXAMINATION:  XR CHEST PA AND LATERAL    CLINICAL HISTORY:  Right upper quadrant pain    TECHNIQUE:  PA and lateral views of the chest were performed.    COMPARISON:  04/20/2023, 11/12/2021 chest x-ray    FINDINGS:  Cardiac silhouette is stable and nonenlarged.  The lungs appear clear.  There is no evidence of a pleural effusion or pneumothorax.  Imaged osseous structures are stable with mild wedging of 2 adjacent midthoracic vertebra appearing stable suggesting mild remote compression fractures.                                       Medications   ketorolac injection 15 mg (15 mg Intravenous Given 4/14/25 2150)   iohexoL (OMNIPAQUE 350) 350 mg iodine/mL injection (75 mLs Intravenous Given 4/14/25 2216)     Medical Decision Making  69-year-old male with 2 days of worsening right upper abdominal pain.  Abdominal exam is benign.  No leukocytosis, LFTs and lipase are unremarkable.  CXR appears clear.  D-dimer is normal and my suspicion for PE is low.  Cardiac enzymes are normal.  CT obtained and shows multiple hepatic cystic lesions that appear somewhat atypical, further imaging with a CT or MRI is recommended.  CT also shows mesenteric swirling that could represent acute bowel ischemia related to tethering of bowel although partial nonobstructing mid gut volvulus could have the same appearance.  There is also some benign-appearing pneumatosis of the cecum.  Patient is very nontoxic in appearance, has never had severe pain, and reports that his pain is much improved after treatment with IV Toradol.  I discussed these findings with General surgery, Dr. Leonardo.  He does not recommend further emergent imaging or diagnostic evaluation at this time.  Patient will be admitted to the hospitalist service and he will see the patient in consultation.  Hospitalist has been consulted for admission.    Amount and/or Complexity of Data Reviewed  Labs:  ordered.  Radiology: ordered.    Risk  Prescription drug management.                                      Clinical Impression:  Final diagnoses:  [R10.11] RUQ abdominal pain  [K76.9] Lesion of liver (Primary)          ED Disposition Condition    Observation Stable                    [1]   Social History  Tobacco Use    Smoking status: Every Day     Current packs/day: 1.00     Average packs/day: 1 pack/day for 40.0 years (40.0 ttl pk-yrs)     Types: Cigarettes    Smokeless tobacco: Never   Substance Use Topics    Alcohol use: No    Drug use: No        Estuardo Velasco MD  04/15/25 0152

## 2025-04-16 ENCOUNTER — PATIENT OUTREACH (OUTPATIENT)
Dept: ADMINISTRATIVE | Facility: CLINIC | Age: 70
End: 2025-04-16
Payer: MEDICARE

## 2025-04-16 NOTE — PROGRESS NOTES
Patient Information     Patient Name  Ha Jacome MRN  [id number] Sex  Male   1934      Letter by Garry Alberts MD at      Author:  Garry Alberts MD Service:  (none) Author Type:  (none)    Filed:   Encounter Date:  3/2/2017 Status:  (Other)           Ha Jacome  [id number] [facility] [place] [address]          2017    Dear Patrick:    Your vitamin B12 and testosterone levels are normal.    It was a pleasure seeing you the other day.  If you have any questions, please don't hesitate to call us.     Sincerely,          Garry Alberts MD                                              Results for orders placed or performed in visit on 17      COMP METABOLIC PANEL      Result  Value Ref Range    SODIUM 142 133 - 143 mmol/L    POTASSIUM 4.2 3.5 - 5.1 mmol/L    CHLORIDE 105 98 - 107 mmol/L    CO2,TOTAL 26 21 - 31 mmol/L    ANION GAP 11 5 - 18                    GLUCOSE 119 (H) 70 - 105 mg/dL    CALCIUM 10.0 8.6 - 10.3 mg/dL    BUN 20 7 - 25 mg/dL    CREATININE 1.25 0.70 - 1.30 mg/dL    BUN/CREAT RATIO           16                    GFR if African American >60 >60 ml/min/1.73m2    GFR if not African American 55 (L) >60 ml/min/1.73m2    ALBUMIN 4.2 3.5 - 5.7 g/dL    PROTEIN,TOTAL 7.0 6.4 - 8.9 g/dL    GLOBULIN                  2.8 2.0 - 3.7 g/dL    A/G RATIO 1.5 1.0 - 2.0                    BILIRUBIN,TOTAL 1.0 0.3 - 1.0 mg/dL    ALK PHOSPHATASE 68 34 - 104 IU/L    ALT (SGPT) 21 7 - 52 IU/L    AST (SGOT) 23 13 - 39 IU/L   PSA TOTAL (DIAGNOSTIC)      Result  Value Ref Range    PSA TOTAL (DIAGNOSTIC) 2.398 <=3.100 ng/mL   LIPID PANEL      Result  Value Ref Range    CHOLESTEROL,TOTAL 171 <200 mg/dL    TRIGLYCERIDES 45 <150 mg/dL    HDL CHOLESTEROL 54 23 - 92 mg/dL    NON-HDL CHOLESTEROL 117 <145 mg/dl    CHOL/HDL RATIO            3.17 <4.50                    LDL CHOLESTEROL 108 (H) <100 mg/dL    PATIENT STATUS            FASTING                   URIC ACID      Result  Value Ref Range  Spoke with Javid Atwood's wife Eleanor. TCC call complete. Patient does not have an [facility] PCP--advised to contact PCP to schedule hospital follow up within seven days.       URIC ACID 6.1 4.4 - 7.6 mg/dL   TESTOSTERONE TOTAL AND FREE      Result  Value Ref Range    TESTOSTERONE FREE 9.23 2.88 - 10.5 ng/dL    TESTOSTERONE,TOTAL        710 240 - 950 ng/dL   CBC WITH AUTO DIFFERENTIAL      Result  Value Ref Range    WHITE BLOOD COUNT         5.4 4.5 - 11.0 thou/cu mm    RED BLOOD COUNT           4.79 4.30 - 5.90 mil/cu mm    HEMOGLOBIN                16.2 13.5 - 17.5 g/dL    HEMATOCRIT                48.5 37.0 - 53.0 %    MCV                       101 (H) 80 - 100 fL    MCH                       33.7 26.0 - 34.0 pg    MCHC                      33.3 32.0 - 36.0 g/dL    RDW                       13.7 11.5 - 15.5 %    PLATELET COUNT            195 140 - 440 thou/cu mm    MPV                       7.7 6.5 - 11.0 fL    NEUTROPHILS               56.1 42.0 - 72.0 %    LYMPHOCYTES               28.8 20.0 - 44.0 %    MONOCYTES                 7.0 <12.0 %    EOSINOPHILS               7.1 <8.0 %    BASOPHILS                 1.0 <3.0 %    ABSOLUTE NEUTROPHILS      3.0 1.7 - 7.0 thou/cu mm    ABSOLUTE LYMPHOCYTES      1.6 0.9 - 2.9 thou/cu mm    ABSOLUTE MONOCYTES        0.4 <0.9 thou/cu mm    ABSOLUTE EOSINOPHILS      0.4 <0.5 thou/cu mm    ABSOLUTE BASOPHILS        0.1 <0.3 thou/cu mm   VITAMIN B12      Result  Value Ref Range    VITAMIN B12 601 180 - 914 pg/mL

## 2025-04-17 LAB
OHS QRS DURATION: 98 MS
OHS QTC CALCULATION: 398 MS

## 2025-06-18 DIAGNOSIS — N40.0 ENLARGED PROSTATE: Primary | ICD-10-CM

## 2025-06-18 DIAGNOSIS — N32.89 BLADDER WALL THICKENING: ICD-10-CM

## 2025-06-18 DIAGNOSIS — R63.4 UNINTENTIONAL WEIGHT LOSS: ICD-10-CM

## 2025-06-18 DIAGNOSIS — Z13.1 DIABETES MELLITUS SCREENING: ICD-10-CM

## 2025-06-20 ENCOUNTER — TELEPHONE (OUTPATIENT)
Facility: CLINIC | Age: 70
End: 2025-06-20
Payer: MEDICARE

## 2025-06-20 NOTE — TELEPHONE ENCOUNTER
Writer spoke with pt to schedule him an appt with Dr Neal per a referral from [facility] and pt declined to schedule an appt.

## 2025-06-26 ENCOUNTER — HOSPITAL ENCOUNTER (OUTPATIENT)
Dept: RADIOLOGY | Facility: HOSPITAL | Age: 70
Discharge: HOME | End: 2025-06-26
Attending: NURSE PRACTITIONER
Payer: MEDICARE

## 2025-06-26 DIAGNOSIS — R63.4 UNINTENTIONAL WEIGHT LOSS: ICD-10-CM

## 2025-06-26 DIAGNOSIS — Z13.1 DIABETES MELLITUS SCREENING: ICD-10-CM

## 2025-06-26 DIAGNOSIS — N32.89 BLADDER WALL THICKENING: ICD-10-CM

## 2025-06-26 DIAGNOSIS — N40.0 ENLARGED PROSTATE: ICD-10-CM

## 2025-06-26 PROCEDURE — 74176 CT ABD & PELVIS W/O CONTRAST: CPT | Mod: 26,,, | Performed by: RADIOLOGY

## 2025-06-26 PROCEDURE — 74176 CT ABD & PELVIS W/O CONTRAST: CPT | Mod: TC,PO

## 2025-07-01 DIAGNOSIS — N32.89 BLADDER WALL THICKENING: ICD-10-CM

## 2025-07-01 DIAGNOSIS — E27.9 ADRENAL ABNORMALITY: ICD-10-CM

## 2025-07-01 DIAGNOSIS — R93.5 ABNORMAL COMPUTED TOMOGRAPHY OF ABDOMEN AND PELVIS: ICD-10-CM

## 2025-07-01 DIAGNOSIS — K76.9 HEPATIC LESION: Primary | ICD-10-CM

## 2025-07-03 ENCOUNTER — TELEPHONE (OUTPATIENT)
Facility: CLINIC | Age: 70
End: 2025-07-03
Payer: MEDICARE

## 2025-07-03 NOTE — NURSING
Attempted to call patient in regards to oncology referral to Dr. Mensah. Patient has [employer] insurance. A RFS will need to be completed by Lana CELAYA at [facility] for patient to be seen. LVM for call back.

## 2025-07-07 ENCOUNTER — TELEPHONE (OUTPATIENT)
Facility: CLINIC | Age: 70
End: 2025-07-07
Payer: MEDICARE

## 2025-07-07 NOTE — NURSING
Call received from patient's spouse Eleanor. Notified that a RFS will need to be completed by referring provider at [facility] for patient to have authorization to be seen. Patient wife verbalized understanding.     Message given to [facility] to complete RFS. Requested call back to confirm once completed.

## 2025-07-10 ENCOUNTER — TELEPHONE (OUTPATIENT)
Facility: CLINIC | Age: 70
End: 2025-07-10
Payer: MEDICARE

## 2025-07-10 NOTE — NURSING
Oncology Navigation   Intake  Cancer Type: GI  Type of Referral: External  Date of Referral: 06/27/25  Initial Nurse Navigator Contact: 07/03/25  Referral to Initial Contact Timeline (days): 6  Appointment Date: 07/25/25  Reason if booked > 7 days after scheduling: Specific provider / access     Treatment  Current Status: Staging work-up                          Scheduled patient with Dr. Mensah. Referral received from Lana Huff. Diagnosis abnormal CT, hepatic lesion, bladder wall thickening. Time, date and location reviewed. Contact information given. Referral scanned into media.     Patient does not wish to use Yekra's benefits for this visit. It will be processed through his [employer]. The [employer] is cancelled as a secondary insurance. Patient's wife verbalized understanding of this information and is in agreement.

## 2025-07-18 ENCOUNTER — TELEPHONE (OUTPATIENT)
Dept: ORTHOPEDICS | Facility: CLINIC | Age: 70
End: 2025-07-18
Payer: MEDICARE

## 2025-08-25 DIAGNOSIS — R10.10 UPPER ABDOMINAL PAIN, UNSPECIFIED: Primary | ICD-10-CM

## 2025-09-02 ENCOUNTER — HOSPITAL ENCOUNTER (OUTPATIENT)
Dept: RADIOLOGY | Facility: HOSPITAL | Age: 70
Discharge: HOME | End: 2025-09-02
Payer: OTHER GOVERNMENT

## 2025-09-02 ENCOUNTER — OFFICE VISIT (OUTPATIENT)
Dept: GASTROENTEROLOGY | Facility: CLINIC | Age: 70
End: 2025-09-02
Payer: OTHER GOVERNMENT

## 2025-09-02 VITALS — BODY MASS INDEX: 26.25 KG/M2 | WEIGHT: 177.25 LBS | HEIGHT: 69 IN

## 2025-09-02 DIAGNOSIS — Z86.0100 HISTORY OF COLON POLYPS: ICD-10-CM

## 2025-09-02 DIAGNOSIS — R93.3 ABNORMAL CT SCAN, COLON: ICD-10-CM

## 2025-09-02 DIAGNOSIS — R10.10 UPPER ABDOMINAL PAIN: ICD-10-CM

## 2025-09-02 DIAGNOSIS — R63.4 WEIGHT LOSS: ICD-10-CM

## 2025-09-02 DIAGNOSIS — R10.10 UPPER ABDOMINAL PAIN: Primary | ICD-10-CM

## 2025-09-02 PROCEDURE — 99999 PR PBB SHADOW E&M-EST. PATIENT-LVL III: CPT | Mod: PBBFAC,,,

## 2025-09-02 PROCEDURE — 99213 OFFICE O/P EST LOW 20 MIN: CPT | Mod: PBBFAC,PN

## 2025-09-02 PROCEDURE — 74018 RADEX ABDOMEN 1 VIEW: CPT | Mod: 26,,, | Performed by: RADIOLOGY

## 2025-09-02 PROCEDURE — 74018 RADEX ABDOMEN 1 VIEW: CPT | Mod: TC

## 2025-09-02 PROCEDURE — 99204 OFFICE O/P NEW MOD 45 MIN: CPT | Mod: S$PBB,,,
